# Patient Record
Sex: FEMALE | Race: WHITE | NOT HISPANIC OR LATINO | Employment: OTHER | URBAN - METROPOLITAN AREA
[De-identification: names, ages, dates, MRNs, and addresses within clinical notes are randomized per-mention and may not be internally consistent; named-entity substitution may affect disease eponyms.]

---

## 2023-03-06 DIAGNOSIS — F32.A DEPRESSION, UNSPECIFIED DEPRESSION TYPE: ICD-10-CM

## 2023-03-06 DIAGNOSIS — F06.4 ORGANIC ANXIETY DISORDER: ICD-10-CM

## 2023-03-06 RX ORDER — TRAZODONE HYDROCHLORIDE 100 MG/1
2 TABLET ORAL NIGHTLY
COMMUNITY
Start: 2019-08-05 | End: 2023-03-06 | Stop reason: SDUPTHER
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
## 2023-03-07 PROBLEM — G47.00 INSOMNIA: Status: ACTIVE | Noted: 2023-03-07

## 2023-03-07 PROBLEM — J01.90 ACUTE SINUSITIS: Status: ACTIVE | Noted: 2023-03-07

## 2023-03-07 PROBLEM — M85.80 OSTEOPENIA: Status: ACTIVE | Noted: 2023-03-07

## 2023-03-07 PROBLEM — F17.200 NICOTINE DEPENDENCE: Status: ACTIVE | Noted: 2023-03-07

## 2023-03-07 PROBLEM — S01.81XA LACERATION OF CHIN: Status: ACTIVE | Noted: 2023-03-07

## 2023-03-07 PROBLEM — M79.645 PAIN OF LEFT THUMB: Status: ACTIVE | Noted: 2023-03-07

## 2023-03-07 PROBLEM — M79.601 RIGHT ARM PAIN: Status: ACTIVE | Noted: 2023-03-07

## 2023-03-07 PROBLEM — M77.8 TENDINITIS OF FLEXOR TENDON OF LEFT HAND: Status: ACTIVE | Noted: 2023-03-07

## 2023-03-07 PROBLEM — N95.2 ATROPHY OF VAGINA: Status: ACTIVE | Noted: 2023-03-07

## 2023-03-07 PROBLEM — E78.00 HYPERCHOLESTEROLEMIA: Status: ACTIVE | Noted: 2023-03-07

## 2023-03-07 PROBLEM — J44.9 COPD, MILD (MULTI): Status: ACTIVE | Noted: 2023-03-07

## 2023-03-07 PROBLEM — J01.80 OTHER ACUTE SINUSITIS: Status: ACTIVE | Noted: 2023-03-07

## 2023-03-07 PROBLEM — F32.A DEPRESSION: Status: ACTIVE | Noted: 2023-03-07

## 2023-03-07 PROBLEM — H69.90 EUSTACHIAN TUBE DYSFUNCTION: Status: ACTIVE | Noted: 2023-03-07

## 2023-03-07 PROBLEM — H66.90 OTITIS MEDIA: Status: ACTIVE | Noted: 2023-03-07

## 2023-03-07 PROBLEM — R19.7 DIARRHEA: Status: ACTIVE | Noted: 2023-03-07

## 2023-03-07 PROBLEM — F06.4 ANXIETY DISORDER, TRANSIENT ORGANIC: Status: ACTIVE | Noted: 2023-03-07

## 2023-03-07 PROBLEM — R92.30 DENSE BREAST TISSUE: Status: ACTIVE | Noted: 2023-03-07

## 2023-03-07 PROBLEM — S01.511A LACERATION OF LIP, INITIAL ENCOUNTER: Status: ACTIVE | Noted: 2023-03-07

## 2023-03-07 PROBLEM — J06.9 VIRAL UPPER RESPIRATORY INFECTION: Status: ACTIVE | Noted: 2023-03-07

## 2023-03-07 RX ORDER — ESCITALOPRAM OXALATE 20 MG/1
2 TABLET ORAL DAILY
COMMUNITY
Start: 2018-01-12 | End: 2023-04-06 | Stop reason: SDUPTHER

## 2023-03-07 RX ORDER — TRIAMTERENE/HYDROCHLOROTHIAZID 37.5-25 MG
1 TABLET ORAL DAILY
COMMUNITY
Start: 2018-07-07 | End: 2023-04-06 | Stop reason: SDUPTHER

## 2023-03-07 RX ORDER — ZOLPIDEM TARTRATE 10 MG/1
1 TABLET ORAL NIGHTLY PRN
COMMUNITY
Start: 2015-05-02 | End: 2024-02-26 | Stop reason: SDUPTHER

## 2023-03-07 RX ORDER — TRAZODONE HYDROCHLORIDE 100 MG/1
200 TABLET ORAL NIGHTLY
Qty: 180 TABLET | Refills: 0 | Status: SHIPPED | OUTPATIENT
Start: 2023-03-07 | End: 2023-05-12 | Stop reason: SDUPTHER

## 2023-03-07 RX ORDER — ATORVASTATIN CALCIUM 10 MG/1
1 TABLET, FILM COATED ORAL DAILY
COMMUNITY
Start: 2014-08-21 | End: 2023-04-06 | Stop reason: SDUPTHER

## 2023-03-07 RX ORDER — AMLODIPINE BESYLATE 5 MG/1
1 TABLET ORAL DAILY
COMMUNITY
Start: 2018-07-07 | End: 2023-04-06 | Stop reason: SDUPTHER

## 2023-03-07 RX ORDER — ESTRADIOL 0.1 MG/G
CREAM VAGINAL
COMMUNITY
Start: 2016-05-07 | End: 2024-02-26 | Stop reason: SDUPTHER

## 2023-03-07 RX ORDER — NICOTINE 7MG/24HR
1 PATCH, TRANSDERMAL 24 HOURS TRANSDERMAL
COMMUNITY
Start: 2022-03-08

## 2023-03-10 ENCOUNTER — OFFICE VISIT (OUTPATIENT)
Dept: PRIMARY CARE | Facility: CLINIC | Age: 63
End: 2023-03-10
Payer: COMMERCIAL

## 2023-03-10 ENCOUNTER — LAB (OUTPATIENT)
Dept: LAB | Facility: LAB | Age: 63
End: 2023-03-10
Payer: COMMERCIAL

## 2023-03-10 VITALS — BODY MASS INDEX: 19.63 KG/M2 | WEIGHT: 104 LBS | HEIGHT: 61 IN

## 2023-03-10 DIAGNOSIS — Z87.891 PERSONAL HISTORY OF NICOTINE DEPENDENCE: ICD-10-CM

## 2023-03-10 DIAGNOSIS — E83.52 HYPERCALCEMIA: ICD-10-CM

## 2023-03-10 DIAGNOSIS — Z00.00 ROUTINE GENERAL MEDICAL EXAMINATION AT A HEALTH CARE FACILITY: ICD-10-CM

## 2023-03-10 DIAGNOSIS — Z00.00 ROUTINE GENERAL MEDICAL EXAMINATION AT A HEALTH CARE FACILITY: Primary | ICD-10-CM

## 2023-03-10 LAB
ALANINE AMINOTRANSFERASE (SGPT) (U/L) IN SER/PLAS: 18 U/L (ref 7–45)
ALBUMIN (G/DL) IN SER/PLAS: 4.4 G/DL (ref 3.4–5)
ALKALINE PHOSPHATASE (U/L) IN SER/PLAS: 55 U/L (ref 33–136)
ANION GAP IN SER/PLAS: 11 MMOL/L (ref 10–20)
ASPARTATE AMINOTRANSFERASE (SGOT) (U/L) IN SER/PLAS: 18 U/L (ref 9–39)
BASOPHILS (10*3/UL) IN BLOOD BY AUTOMATED COUNT: 0.1 X10E9/L (ref 0–0.1)
BASOPHILS/100 LEUKOCYTES IN BLOOD BY AUTOMATED COUNT: 0.7 % (ref 0–2)
BILIRUBIN TOTAL (MG/DL) IN SER/PLAS: 0.4 MG/DL (ref 0–1.2)
CALCIDIOL (25 OH VITAMIN D3) (NG/ML) IN SER/PLAS: 33 NG/ML
CALCIUM (MG/DL) IN SER/PLAS: 10.6 MG/DL (ref 8.6–10.6)
CARBON DIOXIDE, TOTAL (MMOL/L) IN SER/PLAS: 31 MMOL/L (ref 21–32)
CHLORIDE (MMOL/L) IN SER/PLAS: 96 MMOL/L (ref 98–107)
CHOLESTEROL (MG/DL) IN SER/PLAS: 199 MG/DL (ref 0–199)
CHOLESTEROL IN HDL (MG/DL) IN SER/PLAS: 68.9 MG/DL
CHOLESTEROL/HDL RATIO: 2.9
CREATININE (MG/DL) IN SER/PLAS: 0.64 MG/DL (ref 0.5–1.05)
EOSINOPHILS (10*3/UL) IN BLOOD BY AUTOMATED COUNT: 0.25 X10E9/L (ref 0–0.7)
EOSINOPHILS/100 LEUKOCYTES IN BLOOD BY AUTOMATED COUNT: 1.7 % (ref 0–6)
ERYTHROCYTE DISTRIBUTION WIDTH (RATIO) BY AUTOMATED COUNT: 11.7 % (ref 11.5–14.5)
ERYTHROCYTE MEAN CORPUSCULAR HEMOGLOBIN CONCENTRATION (G/DL) BY AUTOMATED: 34.8 G/DL (ref 32–36)
ERYTHROCYTE MEAN CORPUSCULAR VOLUME (FL) BY AUTOMATED COUNT: 96 FL (ref 80–100)
ERYTHROCYTES (10*6/UL) IN BLOOD BY AUTOMATED COUNT: 4.39 X10E12/L (ref 4–5.2)
ESTIMATED AVERAGE GLUCOSE FOR HBA1C: 108 MG/DL
GFR FEMALE: >90 ML/MIN/1.73M2
GLUCOSE (MG/DL) IN SER/PLAS: 95 MG/DL (ref 74–99)
HEMATOCRIT (%) IN BLOOD BY AUTOMATED COUNT: 42.3 % (ref 36–46)
HEMOGLOBIN (G/DL) IN BLOOD: 14.7 G/DL (ref 12–16)
HEMOGLOBIN A1C/HEMOGLOBIN TOTAL IN BLOOD: 5.4 %
IMMATURE GRANULOCYTES/100 LEUKOCYTES IN BLOOD BY AUTOMATED COUNT: 1.2 % (ref 0–0.9)
LDL: ABNORMAL MG/DL (ref 0–99)
LEUKOCYTES (10*3/UL) IN BLOOD BY AUTOMATED COUNT: 14.7 X10E9/L (ref 4.4–11.3)
LYMPHOCYTES (10*3/UL) IN BLOOD BY AUTOMATED COUNT: 3.17 X10E9/L (ref 1.2–4.8)
LYMPHOCYTES/100 LEUKOCYTES IN BLOOD BY AUTOMATED COUNT: 21.6 % (ref 13–44)
MONOCYTES (10*3/UL) IN BLOOD BY AUTOMATED COUNT: 0.94 X10E9/L (ref 0.1–1)
MONOCYTES/100 LEUKOCYTES IN BLOOD BY AUTOMATED COUNT: 6.4 % (ref 2–10)
NEUTROPHILS (10*3/UL) IN BLOOD BY AUTOMATED COUNT: 10.05 X10E9/L (ref 1.2–7.7)
NEUTROPHILS/100 LEUKOCYTES IN BLOOD BY AUTOMATED COUNT: 68.4 % (ref 40–80)
NRBC (PER 100 WBCS) BY AUTOMATED COUNT: 0 /100 WBC (ref 0–0)
PARATHYRIN INTACT (PG/ML) IN SER/PLAS: 32.8 PG/ML (ref 18.5–88)
PLATELETS (10*3/UL) IN BLOOD AUTOMATED COUNT: 323 X10E9/L (ref 150–450)
POTASSIUM (MMOL/L) IN SER/PLAS: 3.8 MMOL/L (ref 3.5–5.3)
PROTEIN TOTAL: 6.8 G/DL (ref 6.4–8.2)
SODIUM (MMOL/L) IN SER/PLAS: 134 MMOL/L (ref 136–145)
THYROTROPIN (MIU/L) IN SER/PLAS BY DETECTION LIMIT <= 0.05 MIU/L: 2.69 MIU/L (ref 0.44–3.98)
TRIGLYCERIDE (MG/DL) IN SER/PLAS: 525 MG/DL (ref 0–149)
UREA NITROGEN (MG/DL) IN SER/PLAS: 10 MG/DL (ref 6–23)
VLDL: ABNORMAL MG/DL (ref 0–40)

## 2023-03-10 PROCEDURE — 80061 LIPID PANEL: CPT

## 2023-03-10 PROCEDURE — 83970 ASSAY OF PARATHORMONE: CPT

## 2023-03-10 PROCEDURE — 84443 ASSAY THYROID STIM HORMONE: CPT

## 2023-03-10 PROCEDURE — 99396 PREV VISIT EST AGE 40-64: CPT | Performed by: INTERNAL MEDICINE

## 2023-03-10 PROCEDURE — 80053 COMPREHEN METABOLIC PANEL: CPT

## 2023-03-10 PROCEDURE — 36415 COLL VENOUS BLD VENIPUNCTURE: CPT

## 2023-03-10 PROCEDURE — 82306 VITAMIN D 25 HYDROXY: CPT

## 2023-03-10 PROCEDURE — 85025 COMPLETE CBC W/AUTO DIFF WBC: CPT

## 2023-03-10 PROCEDURE — 83036 HEMOGLOBIN GLYCOSYLATED A1C: CPT

## 2023-03-10 NOTE — PATIENT INSTRUCTIONS
Rosette,     Go to [address] to have lab work done today.   Call [phone] to schedule a CAT scan of the chest for lung cancer screening.   Call [phone] to schedule a mammogram on or after April 26, 2023.   See me in a year!

## 2023-03-10 NOTE — PROGRESS NOTES
"      Gifty Shaw is a 61 yo F presenting for wellness visit.     1. Tobacco abuse, 1/2 ppd x40 years   [ ]CT lung screenng     2. HTN   -amlo   -Maxzide     3. DLD   -atorva 10     4. MDD/ANI/Insomnia   -Lexapro 40   -trazo 200   -Ambien 10 mg (gynecologist prescribes)     5. Osteopenia   -dexa due     6. Atrophic vaginitis   -estrace via gyne     #HM   [ ]due full screen labs incl pth   [ ]CT Lung screening   -pap 12.22 with gyne   -mammo due 4.26.23   -cscope 7.22 --> 5 years   -shingrix x2   -tdap 2018 12/21/2020    11:45 AM 3/1/2021    10:45 AM 12/21/2021    10:28 AM 3/8/2022    10:22 AM 11/1/2022    10:10 AM 12/27/2022    10:48 AM 3/10/2023     2:28 PM   Vitals   Systolic 120 118 110 108  140    Diastolic 80 74 72 72  95    Heart Rate  72  70      Resp  12  12      Height (in) 1.549 m (5' 1\")  1.549 m (5' 1\") 1.549 m (5' 1\") 1.549 m (5' 1\") 1.549 m (5' 1\") 1.549 m (5' 1\")   Weight (lb) 101 99.4 103 106.4 105 104 104   BMI 19.08 kg/m2 18.78 kg/m2 19.46 kg/m2 20.1 kg/m2 19.84 kg/m2 19.65 kg/m2 19.65 kg/m2   BSA (m2) 1.4 m2 1.39 m2 1.42 m2 1.44 m2 1.43 m2 1.43 m2 1.43 m2   Visit Report       Report       Gen Aox3 NAD well appearing   HEENT mmm pharynx clear no cervical LAD   Eyes sclerae clear   CV rrr nl s1/2 no m/r/g  Pulm CTAB no adventitious sounds   Ext warm dry no edema 2+ DP pulse       "

## 2023-04-06 DIAGNOSIS — F32.A DEPRESSION, UNSPECIFIED DEPRESSION TYPE: ICD-10-CM

## 2023-04-06 DIAGNOSIS — E78.00 HYPERCHOLESTEROLEMIA: ICD-10-CM

## 2023-04-06 DIAGNOSIS — I10 HYPERTENSION, UNSPECIFIED TYPE: ICD-10-CM

## 2023-04-06 RX ORDER — ESCITALOPRAM OXALATE 20 MG/1
40 TABLET ORAL DAILY
Qty: 180 TABLET | Refills: 3 | Status: SHIPPED | OUTPATIENT
Start: 2023-04-06

## 2023-04-06 RX ORDER — ATORVASTATIN CALCIUM 10 MG/1
10 TABLET, FILM COATED ORAL DAILY
Qty: 90 TABLET | Refills: 3 | Status: SHIPPED | OUTPATIENT
Start: 2023-04-06

## 2023-04-06 RX ORDER — AMLODIPINE BESYLATE 5 MG/1
5 TABLET ORAL DAILY
Qty: 90 TABLET | Refills: 3 | Status: SHIPPED | OUTPATIENT
Start: 2023-04-06

## 2023-04-06 RX ORDER — TRIAMTERENE/HYDROCHLOROTHIAZID 37.5-25 MG
1 TABLET ORAL DAILY
Qty: 90 TABLET | Refills: 3 | Status: SHIPPED | OUTPATIENT
Start: 2023-04-06

## 2023-05-12 DIAGNOSIS — F06.4 ORGANIC ANXIETY DISORDER: ICD-10-CM

## 2023-05-12 DIAGNOSIS — F32.A DEPRESSION, UNSPECIFIED DEPRESSION TYPE: ICD-10-CM

## 2023-05-12 RX ORDER — TRAZODONE HYDROCHLORIDE 100 MG/1
200 TABLET ORAL NIGHTLY
Qty: 180 TABLET | Refills: 2 | Status: SHIPPED | OUTPATIENT
Start: 2023-05-12 | End: 2024-04-08

## 2023-06-06 LAB
6-ACETYLMORPHINE: <25 NG/ML
7-AMINOCLONAZEPAM: <25 NG/ML
ALPHA-HYDROXYALPRAZOLAM: <25 NG/ML
ALPHA-HYDROXYMIDAZOLAM: <25 NG/ML
ALPRAZOLAM: <25 NG/ML
AMPHETAMINE (PRESENCE) IN URINE BY SCREEN METHOD: ABNORMAL
BARBITURATES PRESENCE IN URINE BY SCREEN METHOD: ABNORMAL
CANNABINOIDS IN URINE BY SCREEN METHOD: ABNORMAL
CHLORDIAZEPOXIDE: <25 NG/ML
CLONAZEPAM: <25 NG/ML
COCAINE (PRESENCE) IN URINE BY SCREEN METHOD: ABNORMAL
CODEINE: <50 NG/ML
CREATINE, URINE FOR DRUG: 50.4 MG/DL
DIAZEPAM: <25 NG/ML
DRUG SCREEN COMMENT URINE: ABNORMAL
EDDP: <25 NG/ML
FENTANYL CONFIRMATION, URINE: <2.5 NG/ML
HYDROCODONE: <25 NG/ML
HYDROMORPHONE: <25 NG/ML
LORAZEPAM: <25 NG/ML
METHADONE CONFIRMATION,URINE: <25 NG/ML
MIDAZOLAM: <25 NG/ML
MORPHINE URINE: <50 NG/ML
NORDIAZEPAM: <25 NG/ML
NORFENTANYL: <2.5 NG/ML
NORHYDROCODONE: <25 NG/ML
NOROXYCODONE: <25 NG/ML
O-DESMETHYLTRAMADOL: <50 NG/ML
OXAZEPAM: <25 NG/ML
OXYCODONE: <25 NG/ML
OXYMORPHONE: <25 NG/ML
PHENCYCLIDINE (PRESENCE) IN URINE BY SCREEN METHOD: ABNORMAL
TEMAZEPAM: <25 NG/ML
TRAMADOL: <50 NG/ML
ZOLPIDEM METABOLITE (ZCA): 297 NG/ML
ZOLPIDEM: <25 NG/ML

## 2023-10-03 ENCOUNTER — OFFICE VISIT (OUTPATIENT)
Dept: DERMATOLOGY | Facility: CLINIC | Age: 63
End: 2023-10-03
Payer: COMMERCIAL

## 2023-10-03 DIAGNOSIS — Z86.018 HISTORY OF DYSPLASTIC NEVUS: ICD-10-CM

## 2023-10-03 DIAGNOSIS — L57.0 ACTINIC KERATOSIS: ICD-10-CM

## 2023-10-03 DIAGNOSIS — L21.9 SEBORRHEIC DERMATITIS: ICD-10-CM

## 2023-10-03 DIAGNOSIS — D48.5 NEOPLASM OF UNCERTAIN BEHAVIOR OF SKIN: Primary | ICD-10-CM

## 2023-10-03 DIAGNOSIS — L82.1 SEBORRHEIC KERATOSIS: ICD-10-CM

## 2023-10-03 DIAGNOSIS — D22.5 MELANOCYTIC NEVUS OF TRUNK: ICD-10-CM

## 2023-10-03 DIAGNOSIS — L57.8 DIFFUSE PHOTODAMAGE OF SKIN: ICD-10-CM

## 2023-10-03 DIAGNOSIS — L82.0 INFLAMED SEBORRHEIC KERATOSIS: ICD-10-CM

## 2023-10-03 PROCEDURE — 17003 DESTRUCT PREMALG LES 2-14: CPT | Performed by: DERMATOLOGY

## 2023-10-03 PROCEDURE — 11104 PUNCH BX SKIN SINGLE LESION: CPT | Performed by: DERMATOLOGY

## 2023-10-03 PROCEDURE — 17000 DESTRUCT PREMALG LESION: CPT | Performed by: DERMATOLOGY

## 2023-10-03 PROCEDURE — 11302 SHAVE SKIN LESION 1.1-2.0 CM: CPT | Performed by: DERMATOLOGY

## 2023-10-03 PROCEDURE — 17110 DESTRUCTION B9 LES UP TO 14: CPT | Performed by: DERMATOLOGY

## 2023-10-03 PROCEDURE — 99214 OFFICE O/P EST MOD 30 MIN: CPT | Performed by: DERMATOLOGY

## 2023-10-03 PROCEDURE — 11301 SHAVE SKIN LESION 0.6-1.0 CM: CPT | Performed by: DERMATOLOGY

## 2023-10-03 RX ORDER — TRETINOIN 0.25 MG/G
CREAM TOPICAL NIGHTLY
Qty: 45 G | Refills: 11 | Status: SHIPPED | OUTPATIENT
Start: 2023-10-03 | End: 2024-10-02

## 2023-10-03 ASSESSMENT — DERMATOLOGY QUALITY OF LIFE (QOL) ASSESSMENT
RATE HOW BOTHERED YOU ARE BY EFFECTS OF YOUR SKIN PROBLEMS ON YOUR ACTIVITIES (EG, GOING OUT, ACCOMPLISHING WHAT YOU WANT, WORK ACTIVITIES OR YOUR RELATIONSHIPS WITH OTHERS): 0 - NEVER BOTHERED
ARE THERE EXCLUSIONS OR EXCEPTIONS FOR THE QUALITY OF LIFE ASSESSMENT: NO
DATE THE QUALITY-OF-LIFE ASSESSMENT WAS COMPLETED: 66750
WHAT SINGLE SKIN CONDITION LISTED BELOW IS THE PATIENT ANSWERING THE QUALITY-OF-LIFE ASSESSMENT QUESTIONS ABOUT: NONE OF THE ABOVE
RATE HOW BOTHERED YOU ARE BY SYMPTOMS OF YOUR SKIN PROBLEM (EG, ITCHING, STINGING BURNING, HURTING OR SKIN IRRITATION): 2
RATE HOW EMOTIONALLY BOTHERED YOU ARE BY YOUR SKIN PROBLEM (FOR EXAMPLE, WORRY, EMBARRASSMENT, FRUSTRATION): 1

## 2023-10-03 ASSESSMENT — DERMATOLOGY PATIENT ASSESSMENT
DO YOU USE SUNSCREEN: DAILY
DO YOU HAVE ANY NEW OR CHANGING LESIONS: YES
DO YOU HAVE IRREGULAR MENSTRUAL CYCLES: NO
ARE YOU TRYING TO GET PREGNANT: NO
ARE YOU AN ORGAN TRANSPLANT RECIPIENT: NO
HAVE YOU HAD OR DO YOU HAVE VASCULAR DISEASE: NO
DO YOU USE A TANNING BED: NO
HAVE YOU HAD OR DO YOU HAVE A STAPH INFECTION: NO
ARE YOU ON BIRTH CONTROL: NO

## 2023-10-03 ASSESSMENT — PATIENT GLOBAL ASSESSMENT (PGA): PATIENT GLOBAL ASSESSMENT: PATIENT GLOBAL ASSESSMENT:  2 - MILD

## 2023-10-03 ASSESSMENT — ITCH NUMERIC RATING SCALE: HOW SEVERE IS YOUR ITCHING?: 2

## 2023-10-03 NOTE — PROGRESS NOTES
Subjective     Gifty Shaw is a 63 y.o. female who presents for the following: Skin Exam. She notes a painful bump on the back left side of her neck and a raised, scaly, itchy bump on her left upper eyelid.    Review of Systems:  No other skin or systemic complaints other than what is documented elsewhere in the note.    The following portions of the chart were reviewed this encounter and updated as appropriate:         Skin Cancer History  - history of Aks  - dysplastic nevi  - no h/o skin cancer      Specialty Problems          Dermatology Problems    Dog bite        Objective   Well appearing patient in no apparent distress; mood and affect are within normal limits.    A full examination was performed including scalp, head, eyes, ears, nose, lips, neck, chest, axillae, abdomen, back, buttocks, bilateral upper extremities, bilateral lower extremities, hands, feet, fingers, toes, fingernails, and toenails. All findings within normal limits unless otherwise noted below.    Assessment/Plan   1. Neoplasm of uncertain behavior of skin (3)  Left Posterior Neck  3 mm erythematous, tender papule          Lesion biopsy  Type of biopsy: punch    Informed consent: discussed and consent obtained    Timeout: patient name, date of birth, surgical site, and procedure verified    Procedure prep:  Patient was prepped and draped  Anesthesia: the lesion was anesthetized in a standard fashion    Anesthetic:  1% lidocaine w/ epinephrine 1-100,000 local infiltration  Punch size:  3 mm  Suture size:  5-0  Suture type: Prolene (polypropylene)    Suture removal (days):  7  Hemostasis achieved with: suture    Outcome: patient tolerated procedure well    Post-procedure details: sterile dressing applied and wound care instructions given    Dressing type: petrolatum and bandage      Staff Communication: Dermatology Local Anesthesia: 1 % Lidocaine / Epinephrine - Amount:0.5 mL    Specimen 1 - Dermatopathology- DERM LAB  Differential  Diagnosis: folliculitis vs. SCC  Check Margins Yes/No?:    Comments:    Dermpath Lab: Routine Histopathology (formalin-fixed tissue)    Left lateral clavicular chest  Scattered, uniform and benign-appearing, regular brown melanocytic papules and macules.          Shave removal    Lesion length (cm):  0.7  Margin per side (cm):  2  Lesion diameter (cm):  1.1  Informed consent: discussed and consent obtained    Timeout: patient name, date of birth, surgical site, and procedure verified    Procedure prep:  Patient was prepped and draped  Anesthesia: the lesion was anesthetized in a standard fashion    Anesthetic:  1% lidocaine w/ epinephrine 1-100,000 local infiltration  Instrument used: flexible razor blade    Hemostasis achieved with: aluminum chloride    Outcome: patient tolerated procedure well    Post-procedure details: sterile dressing applied and wound care instructions given    Dressing type: bandage and petrolatum      Staff Communication: Dermatology Local Anesthesia: 1 % Lidocaine / Epinephrine - Amount:0.5 mL    Specimen 2 - Dermatopathology- DERM LAB  Differential Diagnosis: DN  Check Margins Yes/No?:    Comments:    Dermpath Lab: Routine Histopathology (formalin-fixed tissue)    Left medial mid leg  Scattered, uniform and benign-appearing, regular brown melanocytic papules and macules.          Shave removal    Lesion length (cm):  0.5  Margin per side (cm):  0.2  Lesion diameter (cm):  0.9  Informed consent: discussed and consent obtained    Timeout: patient name, date of birth, surgical site, and procedure verified    Procedure prep:  Patient was prepped and draped  Anesthesia: the lesion was anesthetized in a standard fashion    Anesthetic:  1% lidocaine w/ epinephrine 1-100,000 local infiltration  Instrument used: flexible razor blade    Hemostasis achieved with: aluminum chloride    Outcome: patient tolerated procedure well    Post-procedure details: sterile dressing applied and wound care  instructions given    Dressing type: bandage and petrolatum      Staff Communication: Dermatology Local Anesthesia: 1 % Lidocaine / Epinephrine - Amount:0.5 mL    Specimen 3 - Dermatopathology- DERM LAB  Differential Diagnosis: DN  Check Margins Yes/No?:    Comments:    Dermpath Lab: Routine Histopathology (formalin-fixed tissue)    2. Actinic keratosis (6)  Head - Anterior (Face) (6)  Scattered on the patient's bilateral cheeks, there are 6 erythematous, gritty, scaly macules     Actinic Keratoses - scattered on bilateral cheeks.  The pre-cancerous nature of these lesions and treatment options were discussed with the patient today.  At this time, I recommend treatment with liquid nitrogen cryotherapy.  The patient expressed understanding, is in agreement with this plan, and wishes to proceed with cryotherapy today.    Destr of lesion - Head - Anterior (Face)  Complexity: simple    Destruction method: cryotherapy    Informed consent: discussed and consent obtained    Lesion destroyed using liquid nitrogen: Yes    Cryotherapy cycles:  2  Outcome: patient tolerated procedure well with no complications    Post-procedure details: wound care instructions given      3. Inflamed seborrheic keratosis  Left Eye  On the patient's left upper eyelid, there is a 3 mm erythematous and light brown-colored, hyperkeratotic, stuck-on appearing papule with a surrounding rim of erythema    Inflamed Seborrheic Keratosis - left upper eyelid.  The benign nature of this lesion was discussed with the patient today and reassurance provided.  Given the history the patient provides of frequent irritation and associated symptoms as well as its inflamed appearance on exam today, I offered to treat this lesion with liquid nitrogen cryotherapy.  The patient expressed understanding, is in agreement with this plan, and wishes to proceed with cryotherapy today.    Destr of lesion - Left Eye  Complexity: simple    Destruction method: cryotherapy     Informed consent: discussed and consent obtained    Lesion destroyed using liquid nitrogen: Yes    Cryotherapy cycles:  2  Outcome: patient tolerated procedure well with no complications    Post-procedure details: wound care instructions given      4. Melanocytic nevus of trunk  Right Abdomen (side) - Upper  Scattered on the patient's face, neck, trunk, and extremities, there are multiple small, round- to oval-shaped, brown-pigmented and pink-colored, symmetric, uniform-appearing macules and dome-shaped papules    Clinically benign- to slightly atypical-appearing nevi - scattered on face, neck, trunk, and extremities.  The clinically benign- to slightly atypical-appearing nature of the remainder of the patient's nevi was discussed with the patient today.  None of the patient's nevi, with the exception of the 2 noted above, meet threshold for biopsy today.  I emphasized the importance of performing monthly self-skin exams using the ABCDs of monitoring moles, which were reviewed with the patient today and an informational hand-out provided.  I also emphasized the importance of sun avoidance and sun protection with daily sunscreen use.    5. Seborrheic keratosis  Left Abdomen (side) - Upper  Scattered on the patient's face, neck, trunk, and extremities, there are multiple tan- to light brown-colored, hyperkeratotic, stuck-on appearing papules of varying size and shape    Seborrheic Keratoses - scattered on face, neck, trunk, and extremities.  The benign nature of these lesions was discussed with the patient today and reassurance provided.  No treatment is medically indicated for the non-inflamed SKs at this time.    6. Seborrheic dermatitis  Head - Anterior (Face)  On the patient's face, mainly the glabella and bilateral eyebrows and perinasal creases, there are pink, scaly patches with whitish-yellowish, greasy scale    Seborrheic Dermatitis - face.  The potentially chronic and intermittently flaring nature of this  condition and treatment options were discussed extensively with the patient today.  At this time, I recommend topical anti-fungal therapy with Ketoconazole 2% cream, which the patient was instructed to apply twice daily to the affected areas of the face.  The risks, benefits, and side effects of this medication were discussed.  The patient expressed understanding and is in agreement with this plan.    7. History of dysplastic nevus  Right Abdomen (side) - Upper  On the patient's trunk and extremities, there are well-healed scars with no evidence of recurrent growth or pigmentation on exam today.    History of dysplastic nevi and photodamage.  There is no evidence of recurrence on exam today.  I emphasized the importance of continuing to perform monthly self-skin exams using the ABCDs of monitoring moles, which were reviewed with the patient, as well as the importance of sun avoidance and sun protection with daily sunscreen use.  I will have the patient return to our office in 1 year, pending the above biopsy results, for routine follow-up and skin exam, and the patient was instructed to call our office should the patient notice any new, changing, symptomatic, or otherwise concerning skin lesions before then.  The patient expressed understanding and is in agreement with this plan.    Related Procedures  Follow Up In Dermatology - Established Patient    8. Diffuse photodamage of skin  Left Breast  Diffuse photodamage with actinic changes with telangiectasia and mottled pigmentation in sun-exposed areas.    Photodamage.  The signs and symptoms of skin cancer were reviewed and the patient was advised to practice sun protection and sun avoidance, use daily sunscreen, and perform regular self skin exams.  In addition, the patient wished to discuss possible medical treatment for photoaging.  Thus, at this time, I recommend the patient begin topical retinoid therapy with Tretinoin 0.025% cream at night.  The risks,  benefits, and side effects of this medication, including the redness, dryness, and irritation expected with its use, were discussed; the patient was instructed to begin use of Tretinoin 1-2 nights per week and increase to every other night, then every night as tolerated without excessive dryness and irritation.  The patient was also informed this medication will likely be considered cosmetic and thus may not be covered by their insurance company and require out-of-pocket payment.  The patient expressed understanding and is in agreement with this plan.    tretinoin (Retin-A) 0.025 % cream - Left Breast  Apply topically once daily at bedtime.

## 2023-10-06 LAB
LABORATORY COMMENT REPORT: NORMAL
PATH REPORT.FINAL DX SPEC: NORMAL
PATH REPORT.GROSS SPEC: NORMAL
PATH REPORT.MICROSCOPIC SPEC OTHER STN: NORMAL
PATH REPORT.RELEVANT HX SPEC: NORMAL
PATH REPORT.TOTAL CANCER: NORMAL

## 2023-10-09 ENCOUNTER — OFFICE VISIT (OUTPATIENT)
Dept: DERMATOLOGY | Facility: CLINIC | Age: 63
End: 2023-10-09
Payer: COMMERCIAL

## 2023-10-09 DIAGNOSIS — L81.4 LENTIGO: ICD-10-CM

## 2023-10-09 DIAGNOSIS — L73.9 FOLLICULITIS: Primary | ICD-10-CM

## 2023-10-09 DIAGNOSIS — L70.0 ACNE VULGARIS: ICD-10-CM

## 2023-10-09 DIAGNOSIS — L57.8 DIFFUSE PHOTODAMAGE OF SKIN: ICD-10-CM

## 2023-10-09 PROCEDURE — 99214 OFFICE O/P EST MOD 30 MIN: CPT | Performed by: DERMATOLOGY

## 2023-10-09 RX ORDER — CLINDAMYCIN PHOSPHATE 10 UG/ML
LOTION TOPICAL 2 TIMES DAILY
Qty: 60 ML | Refills: 11 | Status: SHIPPED | OUTPATIENT
Start: 2023-10-09 | End: 2024-10-08

## 2023-10-09 ASSESSMENT — DERMATOLOGY PATIENT ASSESSMENT
HAVE YOU HAD OR DO YOU HAVE VASCULAR DISEASE: NO
DO YOU USE SUNSCREEN: DAILY
HAVE YOU HAD OR DO YOU HAVE A STAPH INFECTION: NO
ARE YOU ON BIRTH CONTROL: NO
ARE YOU AN ORGAN TRANSPLANT RECIPIENT: NO
DO YOU USE A TANNING BED: NO
DO YOU HAVE IRREGULAR MENSTRUAL CYCLES: NO
ARE YOU TRYING TO GET PREGNANT: NO
DO YOU HAVE ANY NEW OR CHANGING LESIONS: NO

## 2023-10-09 ASSESSMENT — PATIENT GLOBAL ASSESSMENT (PGA): PATIENT GLOBAL ASSESSMENT: PATIENT GLOBAL ASSESSMENT:  1 - CLEAR

## 2023-10-09 ASSESSMENT — ITCH NUMERIC RATING SCALE: HOW SEVERE IS YOUR ITCHING?: 0

## 2023-10-09 ASSESSMENT — DERMATOLOGY QUALITY OF LIFE (QOL) ASSESSMENT
WHAT SINGLE SKIN CONDITION LISTED BELOW IS THE PATIENT ANSWERING THE QUALITY-OF-LIFE ASSESSMENT QUESTIONS ABOUT: NONE OF THE ABOVE
RATE HOW BOTHERED YOU ARE BY EFFECTS OF YOUR SKIN PROBLEMS ON YOUR ACTIVITIES (EG, GOING OUT, ACCOMPLISHING WHAT YOU WANT, WORK ACTIVITIES OR YOUR RELATIONSHIPS WITH OTHERS): 0 - NEVER BOTHERED
RATE HOW BOTHERED YOU ARE BY SYMPTOMS OF YOUR SKIN PROBLEM (EG, ITCHING, STINGING BURNING, HURTING OR SKIN IRRITATION): 0 - NEVER BOTHERED
RATE HOW EMOTIONALLY BOTHERED YOU ARE BY YOUR SKIN PROBLEM (FOR EXAMPLE, WORRY, EMBARRASSMENT, FRUSTRATION): 0 - NEVER BOTHERED

## 2023-10-10 NOTE — PROGRESS NOTES
"Subjective     Gifty Shaw is a 63 y.o. female who presents for the following: OTHER (Discuss recent biopsy result and management options.).     Biopsy of a suspicious lesion on her left posterior neck performed at her last visit in our office on 10/3/23 revealed a \"ruptured hair follicle.\"  Of note, biopsy of 2 atypical appearing pigmented lesions also performed at that visit revealed a lentiginous junctional nevus and venous lake on her left lateral clavicular chest and a lentigo on her left medial mid leg.    Review of Systems:  No other skin or systemic complaints other than what is documented elsewhere in the note.    The following portions of the chart were reviewed this encounter and updated as appropriate:       Skin Cancer History  - history of AKs  - moderately dysplastic junctional nevus on mid right lower back on 10/26/15  - mildly dysplastic junctional nevus on right upper back on 10/9/17  - mildly dysplastic junctional nevus on mid upper back diagnosed on 7/12/19  - no h/o skin cancer  - the patient reports a long history of eczema, for which she has used Ultravate on intermittent lesions for many years, but has been using the same tube for over 8 years and denies any active areas of eczema currently    Specialty Problems          Dermatology Problems    Dog bite       Allergies:  Bupropion, Sulfa (sulfonamide antibiotics), and Sulfamethoxazole-trimethoprim    Current Medications / CAM's:    Current Outpatient Medications:     amLODIPine (Norvasc) 5 mg tablet, Take 1 tablet (5 mg) by mouth once daily., Disp: 90 tablet, Rfl: 3    atorvastatin (Lipitor) 10 mg tablet, Take 1 tablet (10 mg) by mouth once daily., Disp: 90 tablet, Rfl: 3    escitalopram (Lexapro) 20 mg tablet, Take 2 tablets (40 mg) by mouth once daily., Disp: 180 tablet, Rfl: 3    estradiol (Estrace) 0.01 % (0.1 mg/gram) vaginal cream, Insert into the vagina. Apply pea sized amount to vaginal opening every Monday, Wednesday, and " Friday evening, Disp: , Rfl:     nicotine (Nicoderm CQ) 7 mg/24 hr patch, Place 1 patch on the skin once daily., Disp: , Rfl:     traZODone (Desyrel) 100 mg tablet, Take 2 tablets (200 mg) by mouth once daily at bedtime., Disp: 180 tablet, Rfl: 2    tretinoin (Retin-A) 0.025 % cream, Apply topically once daily at bedtime., Disp: 45 g, Rfl: 11    triamterene-hydrochlorothiazid (Maxzide-25) 37.5-25 mg tablet, Take 1 tablet by mouth once daily., Disp: 90 tablet, Rfl: 3    zolpidem (Ambien) 10 mg tablet, Take 1 tablet (10 mg) by mouth as needed at bedtime., Disp: , Rfl:     clindamycin (Cleocin T) 1 % lotion, Apply topically 2 times a day., Disp: 60 mL, Rfl: 11     Objective   Well appearing patient in no apparent distress; mood and affect are within normal limits.    A skin examination was performed including the face and neck. All findings within normal limits unless otherwise noted below.    Assessment/Plan   1. Folliculitis  Neck - Posterior  On her left posterior neck, there is a pink, well-healed scar at her recent biopsy site.  Scattered on her neck, including her posterior neck, there are a few follicular-based erythematous, inflammatory papules and pustules    Folliculitis - posterior neck.  The bacterial nature of this condition and treatment options were discussed with the patient today.  At this time, I recommend topical antibiotic therapy with Clindamycin 1% lotion, which the patient was instructed to apply twice daily to the affected areas or up to 3-4 times per day as needed for active lesions.  The risks, benefits, and side effects of this medication were discussed.  The patient expressed understanding and is in agreement with this plan.  Of note, her suture was removed in the office today as well.    clindamycin (Cleocin T) 1 % lotion - Neck - Posterior  Apply topically 2 times a day.    2. Acne vulgaris    Related Medications  clindamycin (Cleocin T) 1 % lotion  Apply topically 2 times a day.    3.  Lentigo  Photodistributed  Multiple tan- to light brown-colored, round- to oval-shaped, symmetric and uniform-appearing macules and small patches consistent with lentigines scattered in sun-exposed areas.    Solar Lentigines and photodamage.  The clinically benign-appearing nature of these lesions and their relation to chronic sun exposure were discussed with the patient today and reassurance provided.  None of these lesions meet threshold for biopsy today, and thus no treatment is medically indicated for these lesions at this time.  The signs and symptoms of skin cancer were reviewed and the patient was advised to practice sun protection and sun avoidance, use daily sunscreen, and perform regular self skin exams.  The patient was instructed to monitor these lesions for any changes, such as in size, shape, or color, or associated symptoms and to call our office to schedule a return visit for re-evaluation if any such changes or symptoms are noticed in the future.  The patient expressed understanding and is in agreement with this plan.    4. Diffuse photodamage of skin  Photodistributed  Diffuse photodamage with actinic changes with telangiectasia and mottled pigmentation in sun-exposed areas.    Photodamage.  The signs and symptoms of skin cancer were reviewed and the patient was advised to practice sun protection and sun avoidance, use daily sunscreen, and perform regular self skin exams.  Sun protection was discussed, including avoiding the mid-day sun, wearing a sunscreen with SPF at least 50, and stressing the need for reapplication of sunscreen and applying more than they think they need.    Related Medications  tretinoin (Retin-A) 0.025 % cream  Apply topically once daily at bedtime.

## 2024-01-26 ENCOUNTER — APPOINTMENT (OUTPATIENT)
Dept: RADIOLOGY | Facility: CLINIC | Age: 64
End: 2024-01-26
Payer: COMMERCIAL

## 2024-01-26 ENCOUNTER — HOSPITAL ENCOUNTER (OUTPATIENT)
Dept: RADIOLOGY | Facility: CLINIC | Age: 64
Discharge: HOME | End: 2024-01-26
Payer: COMMERCIAL

## 2024-01-26 VITALS — BODY MASS INDEX: 19.65 KG/M2 | WEIGHT: 104.06 LBS | HEIGHT: 61 IN

## 2024-01-26 DIAGNOSIS — Z12.31 ENCOUNTER FOR SCREENING MAMMOGRAM FOR MALIGNANT NEOPLASM OF BREAST: ICD-10-CM

## 2024-01-26 PROCEDURE — 77067 SCR MAMMO BI INCL CAD: CPT | Performed by: RADIOLOGY

## 2024-01-26 PROCEDURE — 77067 SCR MAMMO BI INCL CAD: CPT

## 2024-01-26 PROCEDURE — 77063 BREAST TOMOSYNTHESIS BI: CPT | Performed by: RADIOLOGY

## 2024-02-26 ENCOUNTER — OFFICE VISIT (OUTPATIENT)
Dept: OBSTETRICS AND GYNECOLOGY | Facility: CLINIC | Age: 64
End: 2024-02-26
Payer: COMMERCIAL

## 2024-02-26 VITALS
BODY MASS INDEX: 19.07 KG/M2 | DIASTOLIC BLOOD PRESSURE: 82 MMHG | SYSTOLIC BLOOD PRESSURE: 131 MMHG | WEIGHT: 101 LBS | HEIGHT: 61 IN

## 2024-02-26 DIAGNOSIS — Z01.419 ENCOUNTER FOR GYNECOLOGICAL EXAMINATION WITHOUT ABNORMAL FINDING: ICD-10-CM

## 2024-02-26 DIAGNOSIS — G47.00 INSOMNIA, UNSPECIFIED TYPE: ICD-10-CM

## 2024-02-26 DIAGNOSIS — N95.2 ATROPHY OF VAGINA: Primary | ICD-10-CM

## 2024-02-26 DIAGNOSIS — M85.80 OSTEOPENIA, UNSPECIFIED LOCATION: ICD-10-CM

## 2024-02-26 PROCEDURE — 99396 PREV VISIT EST AGE 40-64: CPT | Performed by: OBSTETRICS & GYNECOLOGY

## 2024-02-26 RX ORDER — ZOLPIDEM TARTRATE 10 MG/1
10 TABLET ORAL NIGHTLY PRN
Qty: 30 TABLET | Refills: 0 | Status: SHIPPED | OUTPATIENT
Start: 2024-02-26 | End: 2024-05-23 | Stop reason: SDUPTHER

## 2024-02-26 RX ORDER — ESTRADIOL 0.1 MG/G
0.5 CREAM VAGINAL 3 TIMES WEEKLY
Qty: 42.5 G | Refills: 3 | Status: SHIPPED | OUTPATIENT
Start: 2024-02-26

## 2024-02-26 NOTE — PROGRESS NOTES
Subjective   Gifty Shaw is a 63 y.o. female here for a routine exam. Current complaints: She has chronic insomnia which is managed with zolpidem 10 mg at bedtime.  She does request a refill.  The last CSA was signed June 1, 2023.  She is managing dryness with estradiol cream.  There is no postmenopausal bleeding or pelvic pain.  No dysuria, no change in bowel habits or vaginal discharge.    She is current on her colonoscopy.    Her bone density March 2021 showed osteopenia of the spine, T-score -2.0.. Personal health questionnaire reviewed: yes.     Gynecologic History  Patient's last menstrual period was 01/01/2004 (approximate).  Contraception: post menopausal status  Last Pap: 12/27/22. Results were: normal  Last mammogram: 1/26/24. Results were: normal    Obstetric History  OB History   No obstetric history on file.       Objective   Constitutional: Alert and in no acute distress. Well developed, well nourished.   Head and Face: Head and face: Normal.    Eyes: Normal external exam - nonicteric sclera, extraocular movements intact (EOMI) and no ptosis.   Neck: No neck asymmetry. Supple. Thyroid not enlarged and there were no palpable thyroid nodules.    Pulmonary: No respiratory distress.   Chest: Breasts: Normal appearance, no nipple discharge and no skin changes. Palpation of breasts and axillae: No palpable mass and no axillary lymphadenopathy.   Abdomen: Soft nontender; no abdominal mass palpated. No organomegaly. No hernias.   Genitourinary: External genitalia: Normal. No inguinal lymphadenopathy. Bartholin's Urethral and Skenes Glands: Normal. Urethra: Normal.  Bladder: Normal on palpation. Vagina: Normal. Cervix: Normal.  Uterus: Normal.  Right Adnexa/parametria: Normal.  Left Adnexa/parametria: Normal.  Inspection of Perianal Area: Normal.   Musculoskeletal: No joint swelling seen, normal movements of all extremities.   Skin: Normal skin color and pigmentation, normal skin turgor, and no rash.    Neurologic: Non-focal. Grossly intact.   Psychiatric: Alert and oriented x 3. Affect normal to patient baseline. Mood: Appropriate.  Physical Exam     Assessment/Plan   Healthy female exam.  This is a 63-year-old female with a normal exam.  No Pap was sent, she is high risk HPV negative.    Her routine mammogram was ordered with tomosynthesis.   he is current on her colonoscopy.    We discussed obtaining a bone density test to monitor the osteopenia.  I do recommend calcium, vitamin D and weightbearing exercise.    The estradiol cream was refilled, she will use a pea-sized amount at the vaginal opening 3 times weekly.  Zolpidem 10 mg was refilled.  I will see her routinely in 1 year.  Mammogram ordered.

## 2024-04-06 DIAGNOSIS — F06.4 ORGANIC ANXIETY DISORDER: ICD-10-CM

## 2024-04-06 DIAGNOSIS — F32.A DEPRESSION, UNSPECIFIED DEPRESSION TYPE: ICD-10-CM

## 2024-04-08 RX ORDER — TRAZODONE HYDROCHLORIDE 100 MG/1
200 TABLET ORAL NIGHTLY
Qty: 180 TABLET | Refills: 3 | Status: SHIPPED | OUTPATIENT
Start: 2024-04-08

## 2024-05-23 DIAGNOSIS — G47.00 INSOMNIA, UNSPECIFIED TYPE: ICD-10-CM

## 2024-05-23 RX ORDER — ZOLPIDEM TARTRATE 10 MG/1
10 TABLET ORAL NIGHTLY PRN
Qty: 30 TABLET | Refills: 0 | Status: SHIPPED | OUTPATIENT
Start: 2024-05-23

## 2024-05-23 NOTE — TELEPHONE ENCOUNTER
Requested Prescriptions     Pending Prescriptions Disp Refills    zolpidem (Ambien) 10 mg tablet 30 tablet 0     Sig: Take 1 tablet (10 mg) by mouth as needed at bedtime for sleep.

## 2024-10-11 ENCOUNTER — APPOINTMENT (OUTPATIENT)
Dept: DERMATOLOGY | Facility: CLINIC | Age: 64
End: 2024-10-11
Payer: COMMERCIAL

## 2024-10-11 DIAGNOSIS — D48.5 NEOPLASM OF UNCERTAIN BEHAVIOR OF SKIN: Primary | ICD-10-CM

## 2024-10-11 DIAGNOSIS — L57.0 ACTINIC KERATOSIS: ICD-10-CM

## 2024-10-11 DIAGNOSIS — L82.1 SEBORRHEIC KERATOSIS: ICD-10-CM

## 2024-10-11 DIAGNOSIS — Z86.018 HISTORY OF DYSPLASTIC NEVUS: ICD-10-CM

## 2024-10-11 DIAGNOSIS — L81.4 LENTIGO: ICD-10-CM

## 2024-10-11 DIAGNOSIS — D22.5 MELANOCYTIC NEVUS OF TRUNK: ICD-10-CM

## 2024-10-11 DIAGNOSIS — L21.9 SEBORRHEIC DERMATITIS: ICD-10-CM

## 2024-10-11 DIAGNOSIS — L57.8 DIFFUSE PHOTODAMAGE OF SKIN: ICD-10-CM

## 2024-10-11 RX ORDER — KETOCONAZOLE 20 MG/G
CREAM TOPICAL
Qty: 60 G | Refills: 11 | Status: SHIPPED | OUTPATIENT
Start: 2024-10-11

## 2024-10-11 RX ORDER — TRETINOIN 0.25 MG/G
1 CREAM TOPICAL NIGHTLY
Qty: 45 G | Refills: 11 | Status: SHIPPED | OUTPATIENT
Start: 2024-10-11

## 2024-10-11 NOTE — PROGRESS NOTES
Subjective     Gifty Shaw is a 64 y.o. female who presents for the following: Skin Exam.  She notes dry, flaky skin on her face, especially in the creases of her nose.  She denies any new, changing, or concerning skin lesions since her last visit; no bleeding, itching, or burning lesions.      Review of Systems:  No other skin or systemic complaints other than what is documented elsewhere in the note.    The following portions of the chart were reviewed this encounter and updated as appropriate:       Skin Cancer History  No skin cancer on file.    Specialty Problems          Dermatology Problems    Dog bite       Past Dermatologic / Past Relevant Medical History:    - history of AKs  - moderately dysplastic junctional nevus on mid right lower back on 10/26/15  - mildly dysplastic junctional nevus on right upper back on 10/9/17  - mildly dysplastic junctional nevus on mid upper back diagnosed on 7/12/19  - no h/o skin cancer  - the patient reports a long history of eczema, for which she has used Ultravate on intermittent lesions for many years, but has been using the same tube for over 8 years and denies any active areas of eczema currently    Family History:    Father - nonmelanoma skin cancer  No family history of melanoma    Social History:    The patient works as a ; her , Juma, is a patient in our office as well, and her mother, Suha, was as well, but recently moved to [place]    Allergies:  Bupropion, Sulfa (sulfonamide antibiotics), and Sulfamethoxazole-trimethoprim    Current Medications / CAM's:    Current Outpatient Medications:     amLODIPine (Norvasc) 5 mg tablet, Take 1 tablet (5 mg) by mouth once daily., Disp: 90 tablet, Rfl: 3    atorvastatin (Lipitor) 10 mg tablet, Take 1 tablet (10 mg) by mouth once daily., Disp: 90 tablet, Rfl: 3    escitalopram (Lexapro) 20 mg tablet, Take 2 tablets (40 mg) by mouth once daily., Disp: 180 tablet, Rfl: 3    estradiol (Estrace) 0.01  % (0.1 mg/gram) vaginal cream, Insert 0.125 Applicatorfuls (0.5 g) into the vagina 3 times a week. Apply pea sized amount to vaginal opening every Monday, Wednesday, and Friday evening, Disp: 42.5 g, Rfl: 3    ketoconazole (NIZOral) 2 % cream, Apply twice daily to affected areas of face, Disp: 60 g, Rfl: 11    nicotine (Nicoderm CQ) 7 mg/24 hr patch, Place 1 patch on the skin once daily., Disp: , Rfl:     traZODone (Desyrel) 100 mg tablet, TAKE 2 TABLETS ONCE DAILY  AT BEDTIME, Disp: 180 tablet, Rfl: 3    tretinoin (Retin-A) 0.025 % cream, Apply 1 Application topically once daily at bedtime. Start 1-2 nights per week 1-2 weeks, inc to every other night, then every night as tolerated, Disp: 45 g, Rfl: 11    triamterene-hydrochlorothiazid (Maxzide-25) 37.5-25 mg tablet, Take 1 tablet by mouth once daily., Disp: 90 tablet, Rfl: 3    zolpidem (Ambien) 10 mg tablet, Take 1 tablet (10 mg) by mouth as needed at bedtime for sleep., Disp: 30 tablet, Rfl: 0     Objective   Well appearing patient in no apparent distress; mood and affect are within normal limits.    A full examination was performed including scalp, face, eyes, ears, nose, lips, neck, chest, axillae, abdomen, back, bilateral upper extremities, and bilateral lower extremities. All findings within normal limits unless otherwise noted below.    Assessment/Plan   1. Neoplasm of uncertain behavior of skin (2)  Right Dorsal Mid-Forearm  5 mm dark brown pigmented, asymmetric macule with an asymmetric pigment network and irregular borders           Shave removal    Lesion length (cm):  0.5  Margin per side (cm):  0.2  Lesion diameter (cm):  0.9  Informed consent: discussed and consent obtained    Timeout: patient name, date of birth, surgical site, and procedure verified    Procedure prep:  Patient was prepped and draped  Anesthesia: the lesion was anesthetized in a standard fashion    Anesthetic:  1% lidocaine w/ epinephrine 1-100,000 local infiltration  Instrument  used: flexible razor blade    Hemostasis achieved with: aluminum chloride    Outcome: patient tolerated procedure well    Post-procedure details: sterile dressing applied and wound care instructions given    Dressing type: bandage and petrolatum      Staff Communication: Dermatology Local Anesthesia: 1 % Lidocaine / Epinephrine - Amount:0.5ml    Specimen 1 - Dermatopathology- DERM LAB  Differential Diagnosis: DN  Check Margins Yes/No?:    Comments:    Dermpath Lab: Routine Histopathology (formalin-fixed tissue)    Left Medial Mid-Back  4 mm dark brown pigmented, asymmetric macule with an asymmetric pigment network and irregular borders           Shave removal    Lesion length (cm):  0.4  Margin per side (cm):  0.2  Lesion diameter (cm):  0.8  Informed consent: discussed and consent obtained    Timeout: patient name, date of birth, surgical site, and procedure verified    Procedure prep:  Patient was prepped and draped  Anesthesia: the lesion was anesthetized in a standard fashion    Anesthetic:  1% lidocaine w/ epinephrine 1-100,000 local infiltration  Instrument used: flexible razor blade    Hemostasis achieved with: aluminum chloride    Outcome: patient tolerated procedure well    Post-procedure details: sterile dressing applied and wound care instructions given    Dressing type: bandage and petrolatum      Staff Communication: Dermatology Local Anesthesia: 1 % Lidocaine / Epinephrine - Amount:0.5ml    Specimen 2 - Dermatopathology- DERM LAB  Differential Diagnosis: DN  Check Margins Yes/No?:    Comments:    Dermpath Lab: Routine Histopathology (formalin-fixed tissue)    2. History of dysplastic nevus  On the patient's mid right lower back, right upper back, and mid upper back, there are well-healed scars with no evidence of recurrent growth or pigmentation on exam today.    History of dysplastic nevi and actinic keratoses and photodamage.  There is no evidence of recurrence on exam today.  I emphasized the  importance of continuing to perform monthly self-skin exams using the ABCDs of monitoring moles, which were reviewed with the patient, as well as the importance of sun avoidance and sun protection with daily sunscreen use.  I will have the patient return to our office in 1 year, pending the above biopsy results, for routine follow-up and skin exam, and the patient was instructed to call our office should the patient notice any new, changing, symptomatic, or otherwise concerning skin lesions before then.  The patient expressed understanding and is in agreement with this plan.    Related Procedures  Follow Up In Dermatology - Established Patient    3. Actinic keratosis (5)  Left Forearm - Anterior (5)  Scattered on the patient's bilateral dorsal forearms, there are 5 erythematous, gritty, scaly macules     Actinic Keratoses -scattered on bilateral dorsal forearms.  The pre-cancerous nature of these lesions and treatment options were discussed with the patient today.  At this time, I recommend treatment with liquid nitrogen cryotherapy.  The patient expressed understanding, is in agreement with this plan, and wishes to proceed with cryotherapy today.    Destr of lesion - Left Forearm - Anterior (5)  Complexity: simple    Destruction method: cryotherapy    Informed consent: discussed and consent obtained    Lesion destroyed using liquid nitrogen: Yes    Cryotherapy cycles:  1  Outcome: patient tolerated procedure well with no complications    Post-procedure details: wound care instructions given      4. Melanocytic nevus of trunk  Scattered on the patient's face, neck, trunk, and extremities, there are multiple small, round- to oval-shaped, brown-pigmented and pink-colored, symmetric, uniform-appearing macules and dome-shaped papules    Clinically benign- to slightly atypical-appearing nevi - the clinically benign- to slightly atypical-appearing nature of the remainder of the patient's nevi was discussed with the  patient today.  None of the patient's nevi, with the exception of the 2 noted above, meet threshold for biopsy today.  I emphasized the importance of performing monthly self-skin exams using the ABCDs of monitoring moles, which were reviewed with the patient today and an informational hand-out provided.  I also emphasized the importance of sun avoidance and sun protection with daily sunscreen use.    5. Seborrheic keratosis  Scattered on the patient's face, neck, trunk, and extremities, there are multiple tan- to light brown-colored, hyperkeratotic, stuck-on appearing papules of varying size and shape    Seborrheic Keratoses - the benign nature of these lesions was discussed with the patient today and reassurance provided.  No treatment is medically indicated for these lesions at this time.    6. Lentigo  Photodistributed  Multiple tan- to light brown-colored, round- to oval-shaped, symmetric and uniform-appearing macules and small patches consistent with lentigines scattered in sun-exposed areas.    Solar Lentigines and photodamage.  The clinically benign-appearing nature of these lesions and their relation to chronic sun exposure were discussed with the patient today and reassurance provided.  None of these lesions meet threshold for biopsy today, and thus no treatment is medically indicated for these lesions at this time.  The signs and symptoms of skin cancer were reviewed and the patient was advised to practice sun protection and sun avoidance, use daily sunscreen, and perform regular self skin exams.  The patient was instructed to monitor these lesions for any changes, such as in size, shape, or color, or associated symptoms and to call our office to schedule a return visit for re-evaluation if any such changes or symptoms are noticed in the future.  The patient expressed understanding and is in agreement with this plan.    7. Seborrheic dermatitis  Head - Anterior (Face)  On the patient's face, mainly the  glabella and bilateral eyebrows and perinasal creases, there are pink, scaly patches with whitish-yellowish, greasy scale    Seborrheic Dermatitis - flare on face.  The potentially chronic and intermittently flaring nature of this condition and treatment options were discussed extensively with the patient today.  At this time, I recommend topical anti-fungal therapy with Ketoconazole 2% cream, which the patient was instructed to apply twice daily to the affected areas of the face.  The risks, benefits, and side effects of this medication were discussed.  The patient expressed understanding and is in agreement with this plan.    ketoconazole (NIZOral) 2 % cream - Head - Anterior (Face)  Apply twice daily to affected areas of face    8. Diffuse photodamage of skin  Photodistributed  Diffuse photodamage with actinic changes with telangiectasia and mottled pigmentation in sun-exposed areas.    Photodamage.  The signs and symptoms of skin cancer were reviewed and the patient was advised to practice sun protection and sun avoidance, use daily sunscreen, and perform regular self skin exams.  In addition, the patient wished to discuss possible medical treatment for photoaging.  Thus, at this time, I recommend the patient begin topical retinoid therapy with Tretinoin 0.025% cream at night.  The risks, benefits, and side effects of this medication, including the redness, dryness, and irritation expected with its use, were discussed; the patient was instructed to begin use of Tretinoin 1-2 nights per week and increase to every other night, then every night as tolerated without excessive dryness and irritation.  The patient was also informed this medication will likely be considered cosmetic and thus may not be covered by their insurance company and require out-of-pocket payment.  The patient expressed understanding and is in agreement with this plan.    tretinoin (Retin-A) 0.025 % cream - Photodistributed  Apply 1 Application  topically once daily at bedtime. Start 1-2 nights per week 1-2 weeks, inc to every other night, then every night as tolerated

## 2024-10-17 LAB
LAB AP ASR DISCLAIMER: NORMAL
LABORATORY COMMENT REPORT: NORMAL
PATH REPORT.FINAL DX SPEC: NORMAL
PATH REPORT.GROSS SPEC: NORMAL
PATH REPORT.MICROSCOPIC SPEC OTHER STN: NORMAL
PATH REPORT.RELEVANT HX SPEC: NORMAL
PATH REPORT.TOTAL CANCER: NORMAL

## 2024-11-18 DIAGNOSIS — G47.00 INSOMNIA, UNSPECIFIED TYPE: ICD-10-CM

## 2024-11-18 RX ORDER — ZOLPIDEM TARTRATE 10 MG/1
10 TABLET ORAL NIGHTLY PRN
Qty: 30 TABLET | Refills: 0 | Status: SHIPPED | OUTPATIENT
Start: 2024-11-18

## 2024-11-26 DIAGNOSIS — I10 HYPERTENSION, UNSPECIFIED TYPE: ICD-10-CM

## 2024-11-27 RX ORDER — AMLODIPINE BESYLATE 5 MG/1
5 TABLET ORAL DAILY
Qty: 90 TABLET | Refills: 3 | OUTPATIENT
Start: 2024-11-27

## 2024-12-13 ENCOUNTER — HOSPITAL ENCOUNTER (OUTPATIENT)
Dept: RADIOLOGY | Facility: CLINIC | Age: 64
Discharge: HOME | End: 2024-12-13
Payer: COMMERCIAL

## 2024-12-13 DIAGNOSIS — Z87.891 PERSONAL HISTORY OF NICOTINE DEPENDENCE: ICD-10-CM

## 2024-12-13 PROCEDURE — 71271 CT THORAX LUNG CANCER SCR C-: CPT

## 2025-01-07 ENCOUNTER — OFFICE VISIT (OUTPATIENT)
Dept: SURGERY | Facility: CLINIC | Age: 65
End: 2025-01-07
Payer: COMMERCIAL

## 2025-01-07 VITALS
SYSTOLIC BLOOD PRESSURE: 106 MMHG | DIASTOLIC BLOOD PRESSURE: 61 MMHG | BODY MASS INDEX: 18.84 KG/M2 | HEART RATE: 87 BPM | WEIGHT: 99.8 LBS | OXYGEN SATURATION: 96 % | TEMPERATURE: 96.1 F | HEIGHT: 61 IN

## 2025-01-07 DIAGNOSIS — R91.8 LUNG NODULES: Primary | ICD-10-CM

## 2025-01-07 PROCEDURE — 99205 OFFICE O/P NEW HI 60 MIN: CPT | Performed by: STUDENT IN AN ORGANIZED HEALTH CARE EDUCATION/TRAINING PROGRAM

## 2025-01-07 PROCEDURE — 4004F PT TOBACCO SCREEN RCVD TLK: CPT | Performed by: STUDENT IN AN ORGANIZED HEALTH CARE EDUCATION/TRAINING PROGRAM

## 2025-01-07 PROCEDURE — 99215 OFFICE O/P EST HI 40 MIN: CPT | Mod: 57 | Performed by: STUDENT IN AN ORGANIZED HEALTH CARE EDUCATION/TRAINING PROGRAM

## 2025-01-07 PROCEDURE — 3008F BODY MASS INDEX DOCD: CPT | Performed by: STUDENT IN AN ORGANIZED HEALTH CARE EDUCATION/TRAINING PROGRAM

## 2025-01-07 RX ORDER — HEPARIN SODIUM 5000 [USP'U]/ML
5000 INJECTION, SOLUTION INTRAVENOUS; SUBCUTANEOUS ONCE
OUTPATIENT
Start: 2025-01-07 | End: 2025-01-07

## 2025-01-07 RX ORDER — CEFAZOLIN SODIUM 2 G/100ML
2 INJECTION, SOLUTION INTRAVENOUS ONCE
OUTPATIENT
Start: 2025-01-07 | End: 2025-01-07

## 2025-01-07 NOTE — H&P (VIEW-ONLY)
Chief complaint:  Lung nodule/GGO    History Of Present Illness  Gifty Shaw is a 64 y.o. female, current smoker (50 years, approx 1/2 ppd - 25 pack year hx), presenting with a right apical spiculated subsolid lesion. Patient was referred by Dr. Colon, PCP.     Patient underwent LDCT in Dec 2024, this showed bilateral apical scarring vs lesions but with a prominent right apical subsolid 1.6cm lesion concerning for malignancy/adenocarcinoma spectral lesion. She denies any new cough, congestion, SOB, or unexplained weight loss. Has unfortunately had diarrhea a couple weeks but no other physical complaints.     Retired .     No history of MI or CVA. Could walk a mile or climb 2 flights of stairs: yes     Past Medical History  She has a past medical history of Eczema, Encounter for gynecological examination (general) (routine) without abnormal findings (05/10/2014), Encounter for screening for malignant neoplasm of vagina, Essential (primary) hypertension (08/01/2013), Other conditions influencing health status, Personal history of other endocrine, nutritional and metabolic disease, Personal history of other medical treatment, and Personal history of other mental and behavioral disorders (08/20/2013).    Surgical History  She has no past surgical history on file.     Social History  She reports that she has been smoking cigarettes. She started smoking about 40 years ago. She has a 40 pack-year smoking history. She has never used smokeless tobacco. She reports current alcohol use of about 2.0 standard drinks of alcohol per week. She reports that she does not use drugs.    Family History  Family history of cancer: No  Family History   Problem Relation Name Age of Onset    Other (cardiac disorder) Father      Colon cancer Other      Hypertension Other          Medications    Current Outpatient Medications:     amLODIPine (Norvasc) 5 mg tablet, Take 1 tablet (5 mg) by mouth once daily., Disp: 90 tablet,  Rfl: 3    atorvastatin (Lipitor) 10 mg tablet, Take 1 tablet (10 mg) by mouth once daily., Disp: 90 tablet, Rfl: 3    escitalopram (Lexapro) 20 mg tablet, Take 2 tablets (40 mg) by mouth once daily., Disp: 180 tablet, Rfl: 3    estradiol (Estrace) 0.01 % (0.1 mg/gram) vaginal cream, Insert 0.125 Applicatorfuls (0.5 g) into the vagina 3 times a week. Apply pea sized amount to vaginal opening every Monday, Wednesday, and Friday evening, Disp: 42.5 g, Rfl: 3    ketoconazole (NIZOral) 2 % cream, Apply twice daily to affected areas of face, Disp: 60 g, Rfl: 11    nicotine (Nicoderm CQ) 7 mg/24 hr patch, Place 1 patch on the skin once daily., Disp: , Rfl:     traZODone (Desyrel) 100 mg tablet, TAKE 2 TABLETS ONCE DAILY  AT BEDTIME, Disp: 180 tablet, Rfl: 3    tretinoin (Retin-A) 0.025 % cream, Apply 1 Application topically once daily at bedtime. Start 1-2 nights per week 1-2 weeks, inc to every other night, then every night as tolerated, Disp: 45 g, Rfl: 11    triamterene-hydrochlorothiazid (Maxzide-25) 37.5-25 mg tablet, Take 1 tablet by mouth once daily., Disp: 90 tablet, Rfl: 3    zolpidem (Ambien) 10 mg tablet, Take 1 tablet (10 mg) by mouth as needed at bedtime for sleep., Disp: 30 tablet, Rfl: 0    Allergies  Bupropion, Sulfa (sulfonamide antibiotics), and Sulfamethoxazole-trimethoprim    Review of Systems:  Review of Systems   Constitutional: No fevers, chills, unexpected weight change  HENT: No sore throat, congestion, or nasal drainage  Eyes: No visual changes or eye itching  Respiratory: see HPI. No cough, worsening dyspnea, wheezing  Cardiac: No chest pain, palpitations, or lower extremity edema  Gastrointestinal: No nausea, vomiting, diarrhea. No abdominal pain  Genitourinary: No dysuria or hematuria  Musculoskeletal: No back pain. No significant myalgias or arthralgias  Neurologic: No headaches, dizziness, or seizures.  Hematologic: No easy bleeding or bruising.  Psychiatric: No anxiety or  "depression.    Physical Exam:  Physical Exam  /61   Pulse 87   Temp 35.6 °C (96.1 °F)   Ht 1.549 m (5' 1\")   Wt 45.3 kg (99 lb 12.8 oz)   LMP 01/01/2004 (Approximate)   SpO2 96%   BMI 18.86 kg/m²   Constitutional:       General: Patient is not in acute distress.     Appearance: Normal appearance; not ill-appearing.   HENT:      Head: Normocephalic.      Nose: No congestion or rhinorrhea.   Cardiovascular:      Rate and Rhythm: Normal rate and regular rhythm.      Pulses: Normal pulses.   Pulmonary:      Effort: Pulmonary effort is normal. No respiratory distress.  No conversational dyspnea     Breath sounds: No stridor. No wheezing.   Abdominal:      General: There is no distension.      Palpations: Abdomen is soft.      Tenderness: There is no abdominal tenderness.   Musculoskeletal:         General: No swelling, tenderness or deformity. Normal range of motion.      Cervical back: Normal range of motion. No rigidity.   Lymphadenopathy:      Cervical: No cervical adenopathy.   Skin:     General: Skin is warm and dry.   Neurological:      General: No focal deficit present.      Mental Status: Patient is alert and oriented to person, place, and time.   Psychiatric:         Mood and Affect: Mood normal.     Relevant Results    Pathology:  N/A     Imaging:    CT lung screening low dose    Result Date: 12/13/2024  Interpreted By:  Jeremias Marte, STUDY: CT LUNG SCREENING LOW DOSE; 12/13/2024 10:27 am   INDICATION: Smoker screening   COMPARISON: No prior   ACCESSION NUMBER(S): [id number]   ORDERING CLINICIAN: GORDON BERGER   TECHNIQUE: Helical data acquisition of the chest was obtained without IV contrast material.  Images were reformatted in axial, coronal, and sagittal planes.   FINDINGS: LUNGS AND AIRWAYS: No trino emphysema. Mild generalized airway thickening. No dense consolidation, mass or central airway obstruction. Benign coarsely calcified granuloma in the right lower lobe. There sub-solid nodular " densities at both apices which are worrisome for multifocal lesions in the lung adenocarcinoma spectrum. Most worrisome at the right apex is 1.6 x 1.5 cm on series 3, image 67 with a solid component at the subpleural lateral right apex which measures up to 6-7 mm on image 62. Other areas of ground-glass nodularity in both apices, including at the right apex measuring up to 1.5 cm and other small nodules annotated for reference on the exam. Numerous other scattered less than 5 mm ground-glass nodular densities elsewhere bilaterally, some of which are annotated for reference on the exam   MEDIASTINUM AND JANAK, LOWER NECK AND AXILLA: No intrathoracic lymphadenopathy. The esophagus is not substantially dilated. No masses are identified in the lower neck   HEART AND VESSELS: Normal heart size. No pericardial effusion. Normal caliber thoracic aorta and pulmonary trunk. No coronary artery calcifications are identified. Coarse calcification at the level of the aortic valve leaflets (series 4, image 169).   UPPER ABDOMEN: No acute upper abdominal finding is identified   CHEST WALL AND OSSEOUS STRUCTURES: No significant soft tissue findings. No lytic or blastic osseous lesion is identified. Small partially calcified lymph node in the right axillary region. Remote fracture deformities of the posterior right 10th and 11th ribs.       1. Findings most compatible with multifocal lesions in the lung adenocarcinoma spectrum, most worrisome at the right apex is 1.6 x 1.5 cm with 6-7 mm solid component, most likely an invasive primary lung adenocarcinoma. This lesion would not be reliably assessed by PET CT given small size of the solid component, therefore strong potential for false negative. Advise surgical consultation for resection. Histologic sampling could be considered depending on the clinical scenario, but a negative biopsy result would be presumed to be discordant. The other nodule densities will require ongoing  surveillance. 2. Estimated coronary artery calcium score is 0* which correlates with at least 0th percentile rank as compared to matched CELIS-study subjects(https://www.celis-nhlbi.org/Calcium/input.aspx). 3. Coarse aortic valve leaflet calcification. Advise correlation with an echocardiogram to exclude any degree of significant associated calcific aortic valvular stenosis.   LUNG RADS CATEGORY: Lung Rad: Lung-RADS 4X, S (Very Suspicious) Recommendation: Further evaluation with diagnostic chest CT with or without contrast, may consider PET/CT if solid component is greater than or equal to 8mm (268 mm3) or tissue sampling depending on probability of malignancy, recommended as per American College of Radiology Guidelines Lung-RADS Version 2022. Management depends on clinical evaluation, patient preference, and the probability of malignancy (https://[email or web address].ca/lung-cancer-screening-and-[email or web address]-prediction/risk-calc ulators/) Recommend F/U with Thoracic Surgeon. Lung-RADS S (Other non-nodular findings) Management as appropriate to finding per American College of Radiology Guidelines Lung-RADS Version 2022.   A yellow alert in epic was placed to ensure clinician receipt of this abnormal result and recommendation for surgical consultation for resection of a presumed invasive primary lung cancer in the right apex. Please note that ongoing surveillance will be required for additional bilateral sub-solid nodular densities which are not actionable at this time. Finding entered by Dr. Marte at 8:58 p.m. on 12/13/2024.   **The patient's CAC score was measured with an FDA-cleared AI tool that correlates well with traditional methods. However, due to the non-gated CT scan and new algorithm, AI-powered scores should not replace traditional cardiovascular risk assessment. For further assistance, refer to the [facility] Cardiovascular Prevention Program via an [employer] referral to 'Cardiology Prevention Program.'   MACRO: None   Signed  "by: Jeremias Marte 12/13/2024 9:00 PM Dictation workstation:   [id number]       Pulmonary Functions Testing Results:    No results found for: \"FEV1\", \"FVC\", \"ZPR1XPT\", \"TLC\", \"DLCO\"    CT chest personally reviewed     Assessment/Plan   Problem List Items Addressed This Visit    None  Visit Diagnoses         Codes    Lung nodules     R91.8    Relevant Orders    Spirometry Pre/Post Bronchodilator    DLCO / Diffusion Capacity    CBC    Comprehensive Metabolic Panel    Protime-INR    Type And Screen            Ms. Shaw is a pleasant 64 year old female with a right apical 1.6cm subsolid lung nodule concerning for adenoCA/spectral lesion. We discussed the other ddx as well. Given her smoking hx and pre-test probability we discussed proceeding straight to surgical wedge if her PFTs are adequate, she is completely agreeable. Will arrange for lung function tests, labs at [facility] and surgery when she returns from her cruise on 2/3 at [facility].        I spent 75 minutes in the professional and overall care of this patient.      Marlena Landin, DO  Thoracic & Esophageal Surgery     "

## 2025-01-07 NOTE — PROGRESS NOTES
Chief complaint:  Lung nodule/GGO    History Of Present Illness  Gifty Shaw is a 64 y.o. female, current smoker (50 years, approx 1/2 ppd - 25 pack year hx), presenting with a right apical spiculated subsolid lesion. Patient was referred by Dr. Colon, PCP.     Patient underwent LDCT in Dec 2024, this showed bilateral apical scarring vs lesions but with a prominent right apical subsolid 1.6cm lesion concerning for malignancy/adenocarcinoma spectral lesion. She denies any new cough, congestion, SOB, or unexplained weight loss. Has unfortunately had diarrhea a couple weeks but no other physical complaints.     Retired .     No history of MI or CVA. Could walk a mile or climb 2 flights of stairs: yes     Past Medical History  She has a past medical history of Eczema, Encounter for gynecological examination (general) (routine) without abnormal findings (05/10/2014), Encounter for screening for malignant neoplasm of vagina, Essential (primary) hypertension (08/01/2013), Other conditions influencing health status, Personal history of other endocrine, nutritional and metabolic disease, Personal history of other medical treatment, and Personal history of other mental and behavioral disorders (08/20/2013).    Surgical History  She has no past surgical history on file.     Social History  She reports that she has been smoking cigarettes. She started smoking about 40 years ago. She has a 40 pack-year smoking history. She has never used smokeless tobacco. She reports current alcohol use of about 2.0 standard drinks of alcohol per week. She reports that she does not use drugs.    Family History  Family history of cancer: No  Family History   Problem Relation Name Age of Onset    Other (cardiac disorder) Father      Colon cancer Other      Hypertension Other          Medications    Current Outpatient Medications:     amLODIPine (Norvasc) 5 mg tablet, Take 1 tablet (5 mg) by mouth once daily., Disp: 90 tablet,  Rfl: 3    atorvastatin (Lipitor) 10 mg tablet, Take 1 tablet (10 mg) by mouth once daily., Disp: 90 tablet, Rfl: 3    escitalopram (Lexapro) 20 mg tablet, Take 2 tablets (40 mg) by mouth once daily., Disp: 180 tablet, Rfl: 3    estradiol (Estrace) 0.01 % (0.1 mg/gram) vaginal cream, Insert 0.125 Applicatorfuls (0.5 g) into the vagina 3 times a week. Apply pea sized amount to vaginal opening every Monday, Wednesday, and Friday evening, Disp: 42.5 g, Rfl: 3    ketoconazole (NIZOral) 2 % cream, Apply twice daily to affected areas of face, Disp: 60 g, Rfl: 11    nicotine (Nicoderm CQ) 7 mg/24 hr patch, Place 1 patch on the skin once daily., Disp: , Rfl:     traZODone (Desyrel) 100 mg tablet, TAKE 2 TABLETS ONCE DAILY  AT BEDTIME, Disp: 180 tablet, Rfl: 3    tretinoin (Retin-A) 0.025 % cream, Apply 1 Application topically once daily at bedtime. Start 1-2 nights per week 1-2 weeks, inc to every other night, then every night as tolerated, Disp: 45 g, Rfl: 11    triamterene-hydrochlorothiazid (Maxzide-25) 37.5-25 mg tablet, Take 1 tablet by mouth once daily., Disp: 90 tablet, Rfl: 3    zolpidem (Ambien) 10 mg tablet, Take 1 tablet (10 mg) by mouth as needed at bedtime for sleep., Disp: 30 tablet, Rfl: 0    Allergies  Bupropion, Sulfa (sulfonamide antibiotics), and Sulfamethoxazole-trimethoprim    Review of Systems:  Review of Systems   Constitutional: No fevers, chills, unexpected weight change  HENT: No sore throat, congestion, or nasal drainage  Eyes: No visual changes or eye itching  Respiratory: see HPI. No cough, worsening dyspnea, wheezing  Cardiac: No chest pain, palpitations, or lower extremity edema  Gastrointestinal: No nausea, vomiting, diarrhea. No abdominal pain  Genitourinary: No dysuria or hematuria  Musculoskeletal: No back pain. No significant myalgias or arthralgias  Neurologic: No headaches, dizziness, or seizures.  Hematologic: No easy bleeding or bruising.  Psychiatric: No anxiety or  "depression.    Physical Exam:  Physical Exam  /61   Pulse 87   Temp 35.6 °C (96.1 °F)   Ht 1.549 m (5' 1\")   Wt 45.3 kg (99 lb 12.8 oz)   LMP 01/01/2004 (Approximate)   SpO2 96%   BMI 18.86 kg/m²   Constitutional:       General: Patient is not in acute distress.     Appearance: Normal appearance; not ill-appearing.   HENT:      Head: Normocephalic.      Nose: No congestion or rhinorrhea.   Cardiovascular:      Rate and Rhythm: Normal rate and regular rhythm.      Pulses: Normal pulses.   Pulmonary:      Effort: Pulmonary effort is normal. No respiratory distress.  No conversational dyspnea     Breath sounds: No stridor. No wheezing.   Abdominal:      General: There is no distension.      Palpations: Abdomen is soft.      Tenderness: There is no abdominal tenderness.   Musculoskeletal:         General: No swelling, tenderness or deformity. Normal range of motion.      Cervical back: Normal range of motion. No rigidity.   Lymphadenopathy:      Cervical: No cervical adenopathy.   Skin:     General: Skin is warm and dry.   Neurological:      General: No focal deficit present.      Mental Status: Patient is alert and oriented to person, place, and time.   Psychiatric:         Mood and Affect: Mood normal.     Relevant Results    Pathology:  N/A     Imaging:    CT lung screening low dose    Result Date: 12/13/2024  Interpreted By:  Jeremias Marte, STUDY: CT LUNG SCREENING LOW DOSE; 12/13/2024 10:27 am   INDICATION: Smoker screening   COMPARISON: No prior   ACCESSION NUMBER(S): [id number]   ORDERING CLINICIAN: GORDON BERGER   TECHNIQUE: Helical data acquisition of the chest was obtained without IV contrast material.  Images were reformatted in axial, coronal, and sagittal planes.   FINDINGS: LUNGS AND AIRWAYS: No trino emphysema. Mild generalized airway thickening. No dense consolidation, mass or central airway obstruction. Benign coarsely calcified granuloma in the right lower lobe. There sub-solid nodular " densities at both apices which are worrisome for multifocal lesions in the lung adenocarcinoma spectrum. Most worrisome at the right apex is 1.6 x 1.5 cm on series 3, image 67 with a solid component at the subpleural lateral right apex which measures up to 6-7 mm on image 62. Other areas of ground-glass nodularity in both apices, including at the right apex measuring up to 1.5 cm and other small nodules annotated for reference on the exam. Numerous other scattered less than 5 mm ground-glass nodular densities elsewhere bilaterally, some of which are annotated for reference on the exam   MEDIASTINUM AND JANAK, LOWER NECK AND AXILLA: No intrathoracic lymphadenopathy. The esophagus is not substantially dilated. No masses are identified in the lower neck   HEART AND VESSELS: Normal heart size. No pericardial effusion. Normal caliber thoracic aorta and pulmonary trunk. No coronary artery calcifications are identified. Coarse calcification at the level of the aortic valve leaflets (series 4, image 169).   UPPER ABDOMEN: No acute upper abdominal finding is identified   CHEST WALL AND OSSEOUS STRUCTURES: No significant soft tissue findings. No lytic or blastic osseous lesion is identified. Small partially calcified lymph node in the right axillary region. Remote fracture deformities of the posterior right 10th and 11th ribs.       1. Findings most compatible with multifocal lesions in the lung adenocarcinoma spectrum, most worrisome at the right apex is 1.6 x 1.5 cm with 6-7 mm solid component, most likely an invasive primary lung adenocarcinoma. This lesion would not be reliably assessed by PET CT given small size of the solid component, therefore strong potential for false negative. Advise surgical consultation for resection. Histologic sampling could be considered depending on the clinical scenario, but a negative biopsy result would be presumed to be discordant. The other nodule densities will require ongoing  surveillance. 2. Estimated coronary artery calcium score is 0* which correlates with at least 0th percentile rank as compared to matched CELIS-study subjects(https://www.celis-nhlbi.org/Calcium/input.aspx). 3. Coarse aortic valve leaflet calcification. Advise correlation with an echocardiogram to exclude any degree of significant associated calcific aortic valvular stenosis.   LUNG RADS CATEGORY: Lung Rad: Lung-RADS 4X, S (Very Suspicious) Recommendation: Further evaluation with diagnostic chest CT with or without contrast, may consider PET/CT if solid component is greater than or equal to 8mm (268 mm3) or tissue sampling depending on probability of malignancy, recommended as per American College of Radiology Guidelines Lung-RADS Version 2022. Management depends on clinical evaluation, patient preference, and the probability of malignancy (https://[facility].ca/lung-cancer-screening-and-risk-prediction/risk-calc ulators/) Recommend F/U with Thoracic Surgeon. Lung-RADS S (Other non-nodular findings) Management as appropriate to finding per American College of Radiology Guidelines Lung-RADS Version 2022.   A yellow alert in epic was placed to ensure clinician receipt of this abnormal result and recommendation for surgical consultation for resection of a presumed invasive primary lung cancer in the right apex. Please note that ongoing surveillance will be required for additional bilateral sub-solid nodular densities which are not actionable at this time. Finding entered by Dr. Marte at 8:58 p.m. on 12/13/2024.   **The patient's CAC score was measured with an FDA-cleared AI tool that correlates well with traditional methods. However, due to the non-gated CT scan and new algorithm, AI-powered scores should not replace traditional cardiovascular risk assessment. For further assistance, refer to the [facility] Cardiovascular Prevention Program via an [employer] referral to 'Cardiology Prevention Program.'   MACRO: None   Signed  "by: Jeremias Marte 12/13/2024 9:00 PM Dictation workstation:   [id number]       Pulmonary Functions Testing Results:    No results found for: \"FEV1\", \"FVC\", \"JZR6FVL\", \"TLC\", \"DLCO\"    CT chest personally reviewed     Assessment/Plan   Problem List Items Addressed This Visit    None  Visit Diagnoses         Codes    Lung nodules     R91.8    Relevant Orders    Spirometry Pre/Post Bronchodilator    DLCO / Diffusion Capacity    CBC    Comprehensive Metabolic Panel    Protime-INR    Type And Screen            Ms. Shaw is a pleasant 64 year old female with a right apical 1.6cm subsolid lung nodule concerning for adenoCA/spectral lesion. We discussed the other ddx as well. Given her smoking hx and pre-test probability we discussed proceeding straight to surgical wedge if her PFTs are adequate, she is completely agreeable. Will arrange for lung function tests, labs at [facility] and surgery when she returns from her cruise on 2/3 at [facility].        I spent 75 minutes in the professional and overall care of this patient.      Marlena Landin, DO  Thoracic & Esophageal Surgery     "

## 2025-01-07 NOTE — PATIENT INSTRUCTIONS
Call the office with any questions [phone]     You are scheduled for surgery on  2/3/25  at  [facility].   You will need blood work only.   You will need to get this done anytime at [facility].     Dr Landin would like you to get Pulmonary Function Tests (PFT's).  You will get a call to schedule your PFT's at a location near you.       RESPIRATORY THERAPY PFT PREP     1.  Wear loose clothing so that your ability to take a deep breath is not restricted.  2.  You may eat a light meal before testing.  3.  No exercise 30 minutes prior to testing.   4.  Do not smoke for 8 hours prior to testing.  5.  No alcohol 4 hours prior.  6.  No caffeine (Coffee, Tea or Soda) 8 hours prior.  7.  Stop short acting inhaled breathing medicines such as Albuterol, Proventil, ventolin, Proair for 8 hours prior.  8.  Stop the following inhaled breathing medicines such as Atrovent and Combivent for 24 hours prior.  9.  Stop the following inhaled breathing medicines such as Serevent, Advair for 48 hours prior.  10. Stop the following inhaled breathing medicines: Spiriva and Anoro for 1 week prior to testing.   11.  Please call to reschedule if you have a bad cold or Upper Respiratory Infection or are running a fever the day of testing.    Approximate time 1 hour.         PRESURGICAL INSTRUCTIONS    **Please call the office with any questions.  [phone]      BLOOD WORK/LABS:  *If your surgeon does not want you to get PAT and has requested you to have blood work/labs drawn, get them done at least 3 days prior to surgery.  Your surgeon will give you instructions on where to have your labs drawn. Labs are good for 21 days prior to surgery date.      MEDICATIONS:  *Take  your regularly scheduled medications the morning of surgery with a sip of water unless instructed by your surgeon.  If you have any questions ask your surgeon.   Exceptions include:  **Blood thinners, (ie: Plavix, Xarelto, Eliquis, Brilinta) check with your surgeon about holding  for surgery. (Aspirin 81 mg is ok to take)   *Ibuprofen, multivitamins, fish oil, supplements- STOP 7 days before surgery.  *Lisinopril/ Losartan/Entresto - HOLD morning of surgery  *Diabetes medications (ie: metformin, glipizide) - HOLD morning of surgery  *Farxiga, Jardiance - Hold 3 days before surgery  *Monjaro, Ozempic, Trulicity, Wegovy, Tanzeum,  Bydyreon - HOLD 1 week prior to surgery    CONTACT SURGEON'S OFFICE IF YOU DEVELOP:  * Fever = 100.4 F   * New respiratory symptoms (e.g. cough, shortness of breath, respiratory distress, sore throat)  * Recent loss of taste or smell  *Flu like symptoms such as headache, fatigue or gastrointestinal symptoms  * You develop any open sores, shingles, burning or painful urination   AND/OR:  * You no longer wish to have the surgery.  * Any other personal circumstances change that may lead to the need to cancel or defer this surgery.  *You were admitted to any hospital within one week of your planned procedure.  *There have been any changes to your insurance, address, or phone number.     SMOKING:  *Stop smoking, using street drugs, or consuming excessive alcohol.   *Quitting smoking can make a huge difference to your health and recovery from surgery.    *If you need help with quitting, call 2-646-QUIT-NOW.       SURGICAL TIME/PARKING AND ARRIVAL:    Sue: You will be contacted between 2 p.m. and 6 p.m. the business day before your surgery with your arrival time. *If you haven't received a call by 6pm, call [phone].  Check in at the Main Entrance desk and let them know you are here for surgery.        *You will be directed to the 2nd floor surgical waiting area.    *Scheduled surgery times may change and you will be notified if this occurs-check your personal voicemail for any updates.    THE DAY BEFORE SURGERY:  *Do not eat any food after midnight the night before surgery.       *Shower with any antibacterial soap the night before and/or the morning of your  surgery.    **After showering, do NOT apply hair products, lotions, powders, creams, makeup, nail polish, Vaseline, or deodorant.**       ON THE MORNING OF SURGERY:  *Do not eat or drink anything.  This includes gum or candy.  *You may take your morning meds with a small sips of water.   DIABETICS:  Please check fasting blood sugar  upon waking up.  If fasting sugar is <80 mg/dl, please drink 100ml/3oz of apple juice no later than 2 hours prior to surgery.  *Brush your teeth before coming to the hospital.   *Do not use moisturizers, creams, lotions or perfume.  *Wear comfortable, loose fitting clothing.   *All jewelry and valuables should be left at home.  *Prosthetic devices such as contact lenses, hearing aids, dentures, eyelash extensions, hairpins and body piercing must be removed before surgery.     BRING WITH YOU:  *Photo ID and insurance card  *Current list of medicines and allergies Include dose and how often you take it.   *Pacemaker/Defibrillator/Heart stent cards  *CPAP machine and mask  *Slings/splints/crutches/walker  *Copy of your complete Advanced Directive/DHPOA-if applicable  *Neurostimulator implant remote       AFTER INPATIENT SURGERY:  *A responsible adult MUST accompany you at the time of discharge and stay with you for 24 hours after your surgery.  *You may NOT drive yourself home after surgery.  *You may use a taxi or ride sharing service ([employer], Uber) to return home ONLY if you are accompanied by a friend or family member.  *Instructions for resuming your medications will be provided by your surgeon.    FOLLOW UP:  *You will be scheduled for a post op follow up visit Dr. Landin about 2 weeks after you leave the hospital.    *You will likely need to get a Chest XRAY prior to seeing her.  There is no scheduled appointment time for this. You can get this done the same day as your appointment 30 min prior in Radiology ([facility] 1st floor/ [facility] 2nd floor).    *If there is  pathology this is usually resulted by this appointment for Dr. Landin to go over with you.      **For More Information about your condition or surgery visit [email or web address]

## 2025-01-13 ENCOUNTER — HOSPITAL ENCOUNTER (OUTPATIENT)
Dept: RESPIRATORY THERAPY | Facility: CLINIC | Age: 65
Discharge: HOME | End: 2025-01-13
Payer: COMMERCIAL

## 2025-01-13 DIAGNOSIS — R91.8 LUNG NODULES: ICD-10-CM

## 2025-01-13 PROCEDURE — 94729 DIFFUSING CAPACITY: CPT

## 2025-01-13 PROCEDURE — 94060 EVALUATION OF WHEEZING: CPT

## 2025-01-15 ENCOUNTER — LAB (OUTPATIENT)
Dept: LAB | Facility: LAB | Age: 65
End: 2025-01-15
Payer: COMMERCIAL

## 2025-01-15 DIAGNOSIS — R91.8 LUNG NODULES: ICD-10-CM

## 2025-01-15 LAB
ABO GROUP (TYPE) IN BLOOD: NORMAL
ALBUMIN SERPL BCP-MCNC: 4.4 G/DL (ref 3.4–5)
ALP SERPL-CCNC: 51 U/L (ref 33–136)
ALT SERPL W P-5'-P-CCNC: 21 U/L (ref 7–45)
ANION GAP SERPL CALC-SCNC: 13 MMOL/L (ref 10–20)
ANTIBODY SCREEN: NORMAL
AST SERPL W P-5'-P-CCNC: 28 U/L (ref 9–39)
BILIRUB SERPL-MCNC: 0.8 MG/DL (ref 0–1.2)
BUN SERPL-MCNC: 12 MG/DL (ref 6–23)
CALCIUM SERPL-MCNC: 10.2 MG/DL (ref 8.6–10.3)
CHLORIDE SERPL-SCNC: 94 MMOL/L (ref 98–107)
CO2 SERPL-SCNC: 29 MMOL/L (ref 21–32)
CREAT SERPL-MCNC: 0.71 MG/DL (ref 0.5–1.05)
EGFRCR SERPLBLD CKD-EPI 2021: >90 ML/MIN/1.73M*2
ERYTHROCYTE [DISTWIDTH] IN BLOOD BY AUTOMATED COUNT: 11.9 % (ref 11.5–14.5)
GLUCOSE SERPL-MCNC: 113 MG/DL (ref 74–99)
HCT VFR BLD AUTO: 43.1 % (ref 36–46)
HGB BLD-MCNC: 15.4 G/DL (ref 12–16)
INR PPP: 1 (ref 0.9–1.1)
MCH RBC QN AUTO: 33.3 PG (ref 26–34)
MCHC RBC AUTO-ENTMCNC: 35.7 G/DL (ref 32–36)
MCV RBC AUTO: 93 FL (ref 80–100)
NRBC BLD-RTO: 0 /100 WBCS (ref 0–0)
PLATELET # BLD AUTO: 335 X10*3/UL (ref 150–450)
POTASSIUM SERPL-SCNC: 3.1 MMOL/L (ref 3.5–5.3)
PROT SERPL-MCNC: 6.9 G/DL (ref 6.4–8.2)
PROTHROMBIN TIME: 10.9 SECONDS (ref 9.8–12.8)
RBC # BLD AUTO: 4.62 X10*6/UL (ref 4–5.2)
RH FACTOR (ANTIGEN D): NORMAL
SODIUM SERPL-SCNC: 133 MMOL/L (ref 136–145)
WBC # BLD AUTO: 12.2 X10*3/UL (ref 4.4–11.3)

## 2025-01-15 PROCEDURE — 86900 BLOOD TYPING SEROLOGIC ABO: CPT

## 2025-01-15 PROCEDURE — 85610 PROTHROMBIN TIME: CPT

## 2025-01-15 PROCEDURE — 85027 COMPLETE CBC AUTOMATED: CPT

## 2025-01-15 PROCEDURE — 86901 BLOOD TYPING SEROLOGIC RH(D): CPT

## 2025-01-15 PROCEDURE — 80053 COMPREHEN METABOLIC PANEL: CPT

## 2025-01-15 PROCEDURE — 86850 RBC ANTIBODY SCREEN: CPT

## 2025-01-31 ENCOUNTER — ANESTHESIA EVENT (OUTPATIENT)
Dept: OPERATING ROOM | Facility: HOSPITAL | Age: 65
End: 2025-01-31
Payer: COMMERCIAL

## 2025-02-03 ENCOUNTER — ANESTHESIA (OUTPATIENT)
Dept: OPERATING ROOM | Facility: HOSPITAL | Age: 65
End: 2025-02-03
Payer: COMMERCIAL

## 2025-02-03 ENCOUNTER — HOSPITAL ENCOUNTER (INPATIENT)
Facility: HOSPITAL | Age: 65
LOS: 3 days | Discharge: HOME | End: 2025-02-06
Attending: STUDENT IN AN ORGANIZED HEALTH CARE EDUCATION/TRAINING PROGRAM | Admitting: STUDENT IN AN ORGANIZED HEALTH CARE EDUCATION/TRAINING PROGRAM
Payer: COMMERCIAL

## 2025-02-03 ENCOUNTER — APPOINTMENT (OUTPATIENT)
Dept: RADIOLOGY | Facility: HOSPITAL | Age: 65
End: 2025-02-03
Payer: COMMERCIAL

## 2025-02-03 DIAGNOSIS — J44.9 COPD, MILD (MULTI): ICD-10-CM

## 2025-02-03 DIAGNOSIS — R91.8 LUNG NODULES: Primary | ICD-10-CM

## 2025-02-03 PROBLEM — L30.9 ECZEMA: Status: ACTIVE | Noted: 2025-02-03

## 2025-02-03 PROBLEM — I10 HTN (HYPERTENSION): Status: ACTIVE | Noted: 2025-02-03

## 2025-02-03 LAB
ABO GROUP (TYPE) IN BLOOD: NORMAL
RH FACTOR (ANTIGEN D): NORMAL

## 2025-02-03 PROCEDURE — 7100000002 HC RECOVERY ROOM TIME - EACH INCREMENTAL 1 MINUTE: Performed by: STUDENT IN AN ORGANIZED HEALTH CARE EDUCATION/TRAINING PROGRAM

## 2025-02-03 PROCEDURE — 32666 THORACOSCOPY W/WEDGE RESECT: CPT | Performed by: STUDENT IN AN ORGANIZED HEALTH CARE EDUCATION/TRAINING PROGRAM

## 2025-02-03 PROCEDURE — 71045 X-RAY EXAM CHEST 1 VIEW: CPT

## 2025-02-03 PROCEDURE — 88331 PATH CONSLTJ SURG 1 BLK 1SPC: CPT | Mod: TC,SUR,AHULAB | Performed by: PATHOLOGY

## 2025-02-03 PROCEDURE — 88331 PATH CONSLTJ SURG 1 BLK 1SPC: CPT | Performed by: PATHOLOGY

## 2025-02-03 PROCEDURE — 81458 SO GSAP DNA CPY NMBR&MCRSTL: CPT | Performed by: STUDENT IN AN ORGANIZED HEALTH CARE EDUCATION/TRAINING PROGRAM

## 2025-02-03 PROCEDURE — 88307 TISSUE EXAM BY PATHOLOGIST: CPT | Performed by: STUDENT IN AN ORGANIZED HEALTH CARE EDUCATION/TRAINING PROGRAM

## 2025-02-03 PROCEDURE — 7100000001 HC RECOVERY ROOM TIME - INITIAL BASE CHARGE: Performed by: STUDENT IN AN ORGANIZED HEALTH CARE EDUCATION/TRAINING PROGRAM

## 2025-02-03 PROCEDURE — 2500000001 HC RX 250 WO HCPCS SELF ADMINISTERED DRUGS (ALT 637 FOR MEDICARE OP): Performed by: NURSE PRACTITIONER

## 2025-02-03 PROCEDURE — P9045 ALBUMIN (HUMAN), 5%, 250 ML: HCPCS | Mod: JZ | Performed by: ANESTHESIOLOGY

## 2025-02-03 PROCEDURE — A32666 PR THORACOSCOPY W/THERA WEDGE RESEXN INITIAL UNILAT: Performed by: ANESTHESIOLOGIST ASSISTANT

## 2025-02-03 PROCEDURE — 9420000001 HC RT PATIENT EDUCATION 5 MIN

## 2025-02-03 PROCEDURE — 2500000004 HC RX 250 GENERAL PHARMACY W/ HCPCS (ALT 636 FOR OP/ED): Mod: JZ | Performed by: ANESTHESIOLOGIST ASSISTANT

## 2025-02-03 PROCEDURE — 71045 X-RAY EXAM CHEST 1 VIEW: CPT | Performed by: RADIOLOGY

## 2025-02-03 PROCEDURE — A32666 PR THORACOSCOPY W/THERA WEDGE RESEXN INITIAL UNILAT: Performed by: ANESTHESIOLOGY

## 2025-02-03 PROCEDURE — 2780000003 HC OR 278 NO HCPCS: Performed by: STUDENT IN AN ORGANIZED HEALTH CARE EDUCATION/TRAINING PROGRAM

## 2025-02-03 PROCEDURE — 3700000001 HC GENERAL ANESTHESIA TIME - INITIAL BASE CHARGE: Performed by: STUDENT IN AN ORGANIZED HEALTH CARE EDUCATION/TRAINING PROGRAM

## 2025-02-03 PROCEDURE — 3600000004 HC OR TIME - INITIAL BASE CHARGE - PROCEDURE LEVEL FOUR: Performed by: STUDENT IN AN ORGANIZED HEALTH CARE EDUCATION/TRAINING PROGRAM

## 2025-02-03 PROCEDURE — 07B70ZZ EXCISION OF THORAX LYMPHATIC, OPEN APPROACH: ICD-10-PCS | Performed by: STUDENT IN AN ORGANIZED HEALTH CARE EDUCATION/TRAINING PROGRAM

## 2025-02-03 PROCEDURE — 88381 MICRODISSECTION MANUAL: CPT | Performed by: STUDENT IN AN ORGANIZED HEALTH CARE EDUCATION/TRAINING PROGRAM

## 2025-02-03 PROCEDURE — G0452 MOLECULAR PATHOLOGY INTERPR: HCPCS | Performed by: STUDENT IN AN ORGANIZED HEALTH CARE EDUCATION/TRAINING PROGRAM

## 2025-02-03 PROCEDURE — 2500000004 HC RX 250 GENERAL PHARMACY W/ HCPCS (ALT 636 FOR OP/ED): Performed by: NURSE PRACTITIONER

## 2025-02-03 PROCEDURE — 3700000002 HC GENERAL ANESTHESIA TIME - EACH INCREMENTAL 1 MINUTE: Performed by: STUDENT IN AN ORGANIZED HEALTH CARE EDUCATION/TRAINING PROGRAM

## 2025-02-03 PROCEDURE — 32674 THORACOSCOPY LYMPH NODE EXC: CPT | Performed by: STUDENT IN AN ORGANIZED HEALTH CARE EDUCATION/TRAINING PROGRAM

## 2025-02-03 PROCEDURE — 2060000001 HC INTERMEDIATE ICU ROOM DAILY

## 2025-02-03 PROCEDURE — 3600000009 HC OR TIME - EACH INCREMENTAL 1 MINUTE - PROCEDURE LEVEL FOUR: Performed by: STUDENT IN AN ORGANIZED HEALTH CARE EDUCATION/TRAINING PROGRAM

## 2025-02-03 PROCEDURE — 2500000005 HC RX 250 GENERAL PHARMACY W/O HCPCS: Performed by: ANESTHESIOLOGIST ASSISTANT

## 2025-02-03 PROCEDURE — 88307 TISSUE EXAM BY PATHOLOGIST: CPT | Mod: TC,AHULAB | Performed by: STUDENT IN AN ORGANIZED HEALTH CARE EDUCATION/TRAINING PROGRAM

## 2025-02-03 PROCEDURE — 88305 TISSUE EXAM BY PATHOLOGIST: CPT | Performed by: STUDENT IN AN ORGANIZED HEALTH CARE EDUCATION/TRAINING PROGRAM

## 2025-02-03 PROCEDURE — 2500000004 HC RX 250 GENERAL PHARMACY W/ HCPCS (ALT 636 FOR OP/ED): Performed by: ANESTHESIOLOGY

## 2025-02-03 PROCEDURE — 2500000004 HC RX 250 GENERAL PHARMACY W/ HCPCS (ALT 636 FOR OP/ED): Performed by: STUDENT IN AN ORGANIZED HEALTH CARE EDUCATION/TRAINING PROGRAM

## 2025-02-03 PROCEDURE — 2500000005 HC RX 250 GENERAL PHARMACY W/O HCPCS: Performed by: STUDENT IN AN ORGANIZED HEALTH CARE EDUCATION/TRAINING PROGRAM

## 2025-02-03 PROCEDURE — 0BBC4ZZ EXCISION OF RIGHT UPPER LUNG LOBE, PERCUTANEOUS ENDOSCOPIC APPROACH: ICD-10-PCS | Performed by: STUDENT IN AN ORGANIZED HEALTH CARE EDUCATION/TRAINING PROGRAM

## 2025-02-03 PROCEDURE — 36415 COLL VENOUS BLD VENIPUNCTURE: CPT | Performed by: STUDENT IN AN ORGANIZED HEALTH CARE EDUCATION/TRAINING PROGRAM

## 2025-02-03 PROCEDURE — 2500000005 HC RX 250 GENERAL PHARMACY W/O HCPCS: Performed by: NURSE PRACTITIONER

## 2025-02-03 PROCEDURE — 2720000007 HC OR 272 NO HCPCS: Performed by: STUDENT IN AN ORGANIZED HEALTH CARE EDUCATION/TRAINING PROGRAM

## 2025-02-03 PROCEDURE — 99233 SBSQ HOSP IP/OBS HIGH 50: CPT | Performed by: NURSE PRACTITIONER

## 2025-02-03 PROCEDURE — 88342 IMHCHEM/IMCYTCHM 1ST ANTB: CPT | Performed by: STUDENT IN AN ORGANIZED HEALTH CARE EDUCATION/TRAINING PROGRAM

## 2025-02-03 RX ORDER — ONDANSETRON HYDROCHLORIDE 2 MG/ML
INJECTION, SOLUTION INTRAVENOUS AS NEEDED
Status: DISCONTINUED | OUTPATIENT
Start: 2025-02-03 | End: 2025-02-03

## 2025-02-03 RX ORDER — ATORVASTATIN CALCIUM 10 MG/1
10 TABLET, FILM COATED ORAL NIGHTLY
Status: DISCONTINUED | OUTPATIENT
Start: 2025-02-03 | End: 2025-02-06 | Stop reason: HOSPADM

## 2025-02-03 RX ORDER — NALOXONE HYDROCHLORIDE 1 MG/ML
0.2 INJECTION INTRAMUSCULAR; INTRAVENOUS; SUBCUTANEOUS EVERY 5 MIN PRN
Status: DISCONTINUED | OUTPATIENT
Start: 2025-02-03 | End: 2025-02-06 | Stop reason: HOSPADM

## 2025-02-03 RX ORDER — MIDAZOLAM HYDROCHLORIDE 1 MG/ML
INJECTION INTRAMUSCULAR; INTRAVENOUS AS NEEDED
Status: DISCONTINUED | OUTPATIENT
Start: 2025-02-03 | End: 2025-02-03

## 2025-02-03 RX ORDER — SODIUM CHLORIDE 0.9 G/100ML
INJECTION, SOLUTION IRRIGATION AS NEEDED
Status: DISCONTINUED | OUTPATIENT
Start: 2025-02-03 | End: 2025-02-03 | Stop reason: HOSPADM

## 2025-02-03 RX ORDER — OXYCODONE HYDROCHLORIDE 5 MG/1
5 TABLET ORAL EVERY 6 HOURS PRN
Status: DISCONTINUED | OUTPATIENT
Start: 2025-02-03 | End: 2025-02-06 | Stop reason: HOSPADM

## 2025-02-03 RX ORDER — ONDANSETRON HYDROCHLORIDE 2 MG/ML
4 INJECTION, SOLUTION INTRAVENOUS EVERY 8 HOURS PRN
Status: DISCONTINUED | OUTPATIENT
Start: 2025-02-03 | End: 2025-02-06 | Stop reason: HOSPADM

## 2025-02-03 RX ORDER — CEFAZOLIN SODIUM 2 G/100ML
2 INJECTION, SOLUTION INTRAVENOUS EVERY 8 HOURS
Status: COMPLETED | OUTPATIENT
Start: 2025-02-03 | End: 2025-02-04

## 2025-02-03 RX ORDER — OXYCODONE HYDROCHLORIDE 5 MG/1
10 TABLET ORAL EVERY 6 HOURS PRN
Status: DISCONTINUED | OUTPATIENT
Start: 2025-02-03 | End: 2025-02-06 | Stop reason: HOSPADM

## 2025-02-03 RX ORDER — AMLODIPINE BESYLATE 5 MG/1
5 TABLET ORAL DAILY
Status: DISCONTINUED | OUTPATIENT
Start: 2025-02-04 | End: 2025-02-06 | Stop reason: HOSPADM

## 2025-02-03 RX ORDER — LIDOCAINE HYDROCHLORIDE 20 MG/ML
INJECTION, SOLUTION EPIDURAL; INFILTRATION; INTRACAUDAL; PERINEURAL AS NEEDED
Status: DISCONTINUED | OUTPATIENT
Start: 2025-02-03 | End: 2025-02-03

## 2025-02-03 RX ORDER — ROCURONIUM BROMIDE 10 MG/ML
INJECTION, SOLUTION INTRAVENOUS AS NEEDED
Status: DISCONTINUED | OUTPATIENT
Start: 2025-02-03 | End: 2025-02-03

## 2025-02-03 RX ORDER — SODIUM CHLORIDE, SODIUM LACTATE, POTASSIUM CHLORIDE, CALCIUM CHLORIDE 600; 310; 30; 20 MG/100ML; MG/100ML; MG/100ML; MG/100ML
INJECTION, SOLUTION INTRAVENOUS CONTINUOUS PRN
Status: DISCONTINUED | OUTPATIENT
Start: 2025-02-03 | End: 2025-02-03

## 2025-02-03 RX ORDER — ALBUMIN HUMAN 50 G/1000ML
25 SOLUTION INTRAVENOUS ONCE
Status: COMPLETED | OUTPATIENT
Start: 2025-02-03 | End: 2025-02-03

## 2025-02-03 RX ORDER — FENTANYL CITRATE 50 UG/ML
INJECTION, SOLUTION INTRAMUSCULAR; INTRAVENOUS AS NEEDED
Status: DISCONTINUED | OUTPATIENT
Start: 2025-02-03 | End: 2025-02-03

## 2025-02-03 RX ORDER — CEFAZOLIN SODIUM 2 G/100ML
2 INJECTION, SOLUTION INTRAVENOUS ONCE
Status: DISCONTINUED | OUTPATIENT
Start: 2025-02-03 | End: 2025-02-03 | Stop reason: HOSPADM

## 2025-02-03 RX ORDER — TRIAMTERENE/HYDROCHLOROTHIAZID 37.5-25 MG
1 TABLET ORAL DAILY
Status: DISCONTINUED | OUTPATIENT
Start: 2025-02-04 | End: 2025-02-06 | Stop reason: HOSPADM

## 2025-02-03 RX ORDER — LIDOCAINE HYDROCHLORIDE 10 MG/ML
0.1 INJECTION, SOLUTION EPIDURAL; INFILTRATION; INTRACAUDAL; PERINEURAL ONCE
Status: DISCONTINUED | OUTPATIENT
Start: 2025-02-03 | End: 2025-02-03 | Stop reason: HOSPADM

## 2025-02-03 RX ORDER — HYDROMORPHONE HYDROCHLORIDE 1 MG/ML
0.2 INJECTION, SOLUTION INTRAMUSCULAR; INTRAVENOUS; SUBCUTANEOUS EVERY 2 HOUR PRN
Status: DISCONTINUED | OUTPATIENT
Start: 2025-02-03 | End: 2025-02-03

## 2025-02-03 RX ORDER — OXYCODONE HYDROCHLORIDE 5 MG/1
5 TABLET ORAL EVERY 4 HOURS PRN
Status: DISCONTINUED | OUTPATIENT
Start: 2025-02-03 | End: 2025-02-03 | Stop reason: HOSPADM

## 2025-02-03 RX ORDER — MEPERIDINE HYDROCHLORIDE 25 MG/ML
12.5 INJECTION INTRAMUSCULAR; INTRAVENOUS; SUBCUTANEOUS EVERY 10 MIN PRN
Status: DISCONTINUED | OUTPATIENT
Start: 2025-02-03 | End: 2025-02-03 | Stop reason: HOSPADM

## 2025-02-03 RX ORDER — HEPARIN SODIUM 5000 [USP'U]/ML
5000 INJECTION, SOLUTION INTRAVENOUS; SUBCUTANEOUS ONCE
Status: COMPLETED | OUTPATIENT
Start: 2025-02-03 | End: 2025-02-03

## 2025-02-03 RX ORDER — HYDROMORPHONE HYDROCHLORIDE 1 MG/ML
INJECTION, SOLUTION INTRAMUSCULAR; INTRAVENOUS; SUBCUTANEOUS AS NEEDED
Status: DISCONTINUED | OUTPATIENT
Start: 2025-02-03 | End: 2025-02-03

## 2025-02-03 RX ORDER — METHOCARBAMOL 500 MG/1
500 TABLET, FILM COATED ORAL EVERY 8 HOURS SCHEDULED
Status: DISCONTINUED | OUTPATIENT
Start: 2025-02-03 | End: 2025-02-06 | Stop reason: HOSPADM

## 2025-02-03 RX ORDER — AMOXICILLIN 250 MG
2 CAPSULE ORAL 2 TIMES DAILY
Status: DISCONTINUED | OUTPATIENT
Start: 2025-02-03 | End: 2025-02-06 | Stop reason: HOSPADM

## 2025-02-03 RX ORDER — PROPOFOL 10 MG/ML
INJECTION, EMULSION INTRAVENOUS AS NEEDED
Status: DISCONTINUED | OUTPATIENT
Start: 2025-02-03 | End: 2025-02-03

## 2025-02-03 RX ORDER — LIDOCAINE 560 MG/1
1 PATCH PERCUTANEOUS; TOPICAL; TRANSDERMAL DAILY
Status: DISCONTINUED | OUTPATIENT
Start: 2025-02-03 | End: 2025-02-06 | Stop reason: HOSPADM

## 2025-02-03 RX ORDER — KETOROLAC TROMETHAMINE 30 MG/ML
15 INJECTION, SOLUTION INTRAMUSCULAR; INTRAVENOUS EVERY 6 HOURS
Status: COMPLETED | OUTPATIENT
Start: 2025-02-03 | End: 2025-02-04

## 2025-02-03 RX ORDER — ACETAMINOPHEN 325 MG/1
650 TABLET ORAL EVERY 6 HOURS
Status: DISCONTINUED | OUTPATIENT
Start: 2025-02-03 | End: 2025-02-06 | Stop reason: HOSPADM

## 2025-02-03 RX ORDER — BUPIVACAINE HCL/EPINEPHRINE 0.25-.0005
VIAL (ML) INJECTION AS NEEDED
Status: DISCONTINUED | OUTPATIENT
Start: 2025-02-03 | End: 2025-02-03 | Stop reason: HOSPADM

## 2025-02-03 RX ORDER — CEFAZOLIN 1 G/1
INJECTION, POWDER, FOR SOLUTION INTRAVENOUS AS NEEDED
Status: DISCONTINUED | OUTPATIENT
Start: 2025-02-03 | End: 2025-02-03

## 2025-02-03 RX ORDER — ONDANSETRON 4 MG/1
4 TABLET, ORALLY DISINTEGRATING ORAL EVERY 8 HOURS PRN
Status: DISCONTINUED | OUTPATIENT
Start: 2025-02-03 | End: 2025-02-06 | Stop reason: HOSPADM

## 2025-02-03 RX ORDER — LABETALOL HYDROCHLORIDE 5 MG/ML
INJECTION, SOLUTION INTRAVENOUS AS NEEDED
Status: DISCONTINUED | OUTPATIENT
Start: 2025-02-03 | End: 2025-02-03

## 2025-02-03 RX ORDER — ONDANSETRON HYDROCHLORIDE 2 MG/ML
4 INJECTION, SOLUTION INTRAVENOUS ONCE AS NEEDED
Status: COMPLETED | OUTPATIENT
Start: 2025-02-03 | End: 2025-02-03

## 2025-02-03 RX ORDER — POLYETHYLENE GLYCOL 3350 17 G/17G
17 POWDER, FOR SOLUTION ORAL DAILY
Status: DISCONTINUED | OUTPATIENT
Start: 2025-02-03 | End: 2025-02-06 | Stop reason: HOSPADM

## 2025-02-03 RX ORDER — ENOXAPARIN SODIUM 100 MG/ML
40 INJECTION SUBCUTANEOUS EVERY 24 HOURS
Status: DISCONTINUED | OUTPATIENT
Start: 2025-02-03 | End: 2025-02-06 | Stop reason: HOSPADM

## 2025-02-03 RX ORDER — ESCITALOPRAM OXALATE 20 MG/1
40 TABLET ORAL DAILY
Status: DISCONTINUED | OUTPATIENT
Start: 2025-02-04 | End: 2025-02-06 | Stop reason: HOSPADM

## 2025-02-03 RX ADMIN — PROPOFOL 20 MG: 10 INJECTION, EMULSION INTRAVENOUS at 11:29

## 2025-02-03 RX ADMIN — CEFAZOLIN SODIUM 2 G: 2 INJECTION, SOLUTION INTRAVENOUS at 18:26

## 2025-02-03 RX ADMIN — LIDOCAINE HYDROCHLORIDE 50 MG: 20 INJECTION, SOLUTION EPIDURAL; INFILTRATION; INTRACAUDAL; PERINEURAL at 10:03

## 2025-02-03 RX ADMIN — PROPOFOL 20 MG: 10 INJECTION, EMULSION INTRAVENOUS at 11:28

## 2025-02-03 RX ADMIN — HYDROMORPHONE HYDROCHLORIDE 0.5 MG: 1 INJECTION, SOLUTION INTRAMUSCULAR; INTRAVENOUS; SUBCUTANEOUS at 12:29

## 2025-02-03 RX ADMIN — ROCURONIUM BROMIDE 50 MG: 10 INJECTION, SOLUTION INTRAVENOUS at 10:03

## 2025-02-03 RX ADMIN — ONDANSETRON 4 MG: 2 INJECTION, SOLUTION INTRAMUSCULAR; INTRAVENOUS at 11:21

## 2025-02-03 RX ADMIN — HYDROMORPHONE HYDROCHLORIDE 0.75 MG: 1 INJECTION, SOLUTION INTRAMUSCULAR; INTRAVENOUS; SUBCUTANEOUS at 11:28

## 2025-02-03 RX ADMIN — PROPOFOL 100 MG: 10 INJECTION, EMULSION INTRAVENOUS at 10:03

## 2025-02-03 RX ADMIN — OXYCODONE HYDROCHLORIDE 5 MG: 5 TABLET ORAL at 22:28

## 2025-02-03 RX ADMIN — PROPOFOL 20 MG: 10 INJECTION, EMULSION INTRAVENOUS at 11:27

## 2025-02-03 RX ADMIN — KETOROLAC TROMETHAMINE 15 MG: 30 INJECTION, SOLUTION INTRAMUSCULAR at 15:41

## 2025-02-03 RX ADMIN — SODIUM CHLORIDE, POTASSIUM CHLORIDE, SODIUM LACTATE AND CALCIUM CHLORIDE: 600; 310; 30; 20 INJECTION, SOLUTION INTRAVENOUS at 08:28

## 2025-02-03 RX ADMIN — LABETALOL HYDROCHLORIDE 10 MG: 5 INJECTION INTRAVENOUS at 10:07

## 2025-02-03 RX ADMIN — HYDROMORPHONE HYDROCHLORIDE 0.25 MG: 1 INJECTION, SOLUTION INTRAMUSCULAR; INTRAVENOUS; SUBCUTANEOUS at 10:58

## 2025-02-03 RX ADMIN — FENTANYL CITRATE 100 MCG: 50 INJECTION, SOLUTION INTRAMUSCULAR; INTRAVENOUS at 10:03

## 2025-02-03 RX ADMIN — ROCURONIUM BROMIDE 20 MG: 10 INJECTION, SOLUTION INTRAVENOUS at 10:56

## 2025-02-03 RX ADMIN — KETOROLAC TROMETHAMINE 15 MG: 30 INJECTION, SOLUTION INTRAMUSCULAR at 18:26

## 2025-02-03 RX ADMIN — ONDANSETRON 4 MG: 2 INJECTION, SOLUTION INTRAMUSCULAR; INTRAVENOUS at 12:29

## 2025-02-03 RX ADMIN — ATORVASTATIN CALCIUM 10 MG: 10 TABLET, FILM COATED ORAL at 22:31

## 2025-02-03 RX ADMIN — OXYCODONE HYDROCHLORIDE 10 MG: 5 TABLET ORAL at 15:42

## 2025-02-03 RX ADMIN — ENOXAPARIN SODIUM 40 MG: 40 INJECTION SUBCUTANEOUS at 22:25

## 2025-02-03 RX ADMIN — SUGAMMADEX 200 MG: 100 INJECTION, SOLUTION INTRAVENOUS at 11:27

## 2025-02-03 RX ADMIN — DEXAMETHASONE SODIUM PHOSPHATE 4 MG: 4 INJECTION, SOLUTION INTRAMUSCULAR; INTRAVENOUS at 10:03

## 2025-02-03 RX ADMIN — METHOCARBAMOL TABLETS 500 MG: 500 TABLET, COATED ORAL at 22:23

## 2025-02-03 RX ADMIN — METHOCARBAMOL TABLETS 500 MG: 500 TABLET, COATED ORAL at 15:46

## 2025-02-03 RX ADMIN — PROPOFOL 20 MG: 10 INJECTION, EMULSION INTRAVENOUS at 11:25

## 2025-02-03 RX ADMIN — PROPOFOL 20 MG: 10 INJECTION, EMULSION INTRAVENOUS at 11:23

## 2025-02-03 RX ADMIN — ROCURONIUM BROMIDE 20 MG: 10 INJECTION, SOLUTION INTRAVENOUS at 10:23

## 2025-02-03 RX ADMIN — LIDOCAINE 4% 1 PATCH: 40 PATCH TOPICAL at 15:40

## 2025-02-03 RX ADMIN — CEFAZOLIN 2 G: 1 INJECTION, POWDER, FOR SOLUTION INTRAMUSCULAR; INTRAVENOUS at 10:09

## 2025-02-03 RX ADMIN — HEPARIN SODIUM 5000 UNITS: 5000 INJECTION, SOLUTION INTRAVENOUS; SUBCUTANEOUS at 09:03

## 2025-02-03 RX ADMIN — SENNOSIDES AND DOCUSATE SODIUM 2 TABLET: 50; 8.6 TABLET ORAL at 22:23

## 2025-02-03 RX ADMIN — ALBUMIN HUMAN 25 G: 0.05 INJECTION, SOLUTION INTRAVENOUS at 13:42

## 2025-02-03 RX ADMIN — MIDAZOLAM HYDROCHLORIDE 2 MG: 1 INJECTION, SOLUTION INTRAMUSCULAR; INTRAVENOUS at 09:57

## 2025-02-03 RX ADMIN — ACETAMINOPHEN 650 MG: 325 TABLET, FILM COATED ORAL at 18:25

## 2025-02-03 RX ADMIN — CARBOXYMETHYLCELLULOSE SODIUM 2 DROP: 5 SOLUTION/ DROPS OPHTHALMIC at 10:04

## 2025-02-03 ASSESSMENT — PAIN - FUNCTIONAL ASSESSMENT
PAIN_FUNCTIONAL_ASSESSMENT: 0-10
PAIN_FUNCTIONAL_ASSESSMENT: UNABLE TO SELF-REPORT
PAIN_FUNCTIONAL_ASSESSMENT: 0-10
PAIN_FUNCTIONAL_ASSESSMENT: 0-10
PAIN_FUNCTIONAL_ASSESSMENT: UNABLE TO SELF-REPORT
PAIN_FUNCTIONAL_ASSESSMENT: 0-10

## 2025-02-03 ASSESSMENT — COGNITIVE AND FUNCTIONAL STATUS - GENERAL
HELP NEEDED FOR BATHING: A LITTLE
MOVING FROM LYING ON BACK TO SITTING ON SIDE OF FLAT BED WITH BEDRAILS: A LITTLE
TOILETING: A LITTLE
MOVING TO AND FROM BED TO CHAIR: A LITTLE
STANDING UP FROM CHAIR USING ARMS: A LITTLE
DAILY ACTIVITIY SCORE: 21
TURNING FROM BACK TO SIDE WHILE IN FLAT BAD: A LITTLE
DRESSING REGULAR LOWER BODY CLOTHING: A LITTLE
MOBILITY SCORE: 20

## 2025-02-03 ASSESSMENT — PAIN SCALES - GENERAL
PAINLEVEL_OUTOF10: 5 - MODERATE PAIN
PAINLEVEL_OUTOF10: 7
PAINLEVEL_OUTOF10: 3
PAINLEVEL_OUTOF10: 5 - MODERATE PAIN
PAINLEVEL_OUTOF10: 7
PAINLEVEL_OUTOF10: 7
PAINLEVEL_OUTOF10: 0 - NO PAIN
PAIN_LEVEL: 0
PAINLEVEL_OUTOF10: 4
PAINLEVEL_OUTOF10: 0 - NO PAIN

## 2025-02-03 ASSESSMENT — PAIN SCALES - PAIN ASSESSMENT IN ADVANCED DEMENTIA (PAINAD): TOTALSCORE: MEDICATION (SEE MAR)

## 2025-02-03 ASSESSMENT — COLUMBIA-SUICIDE SEVERITY RATING SCALE - C-SSRS
1. IN THE PAST MONTH, HAVE YOU WISHED YOU WERE DEAD OR WISHED YOU COULD GO TO SLEEP AND NOT WAKE UP?: NO
2. HAVE YOU ACTUALLY HAD ANY THOUGHTS OF KILLING YOURSELF?: NO
6. HAVE YOU EVER DONE ANYTHING, STARTED TO DO ANYTHING, OR PREPARED TO DO ANYTHING TO END YOUR LIFE?: NO

## 2025-02-03 ASSESSMENT — PAIN DESCRIPTION - LOCATION
LOCATION: RIB CAGE
LOCATION: RIB CAGE
LOCATION: CHEST

## 2025-02-03 ASSESSMENT — PAIN DESCRIPTION - ORIENTATION
ORIENTATION: RIGHT

## 2025-02-03 NOTE — DISCHARGE INSTRUCTIONS
On day of follow up appointment with Dr. Landin -  obtain Chest Xray prior to your appointment in Radiology at [facility]

## 2025-02-03 NOTE — OP NOTE
Right Thoracoscopy; Lung Tissue Excision; Upper Lobe Wedge (R), Right Chest Lymph Node Removal (R) Operative Note     Date: 2/3/2025  OR Location: [facility]    Name: Gifty Shaw, : 1960, Age: 64 y.o., MRN: [id number], Sex: female    Diagnosis  Pre-op Diagnosis      * Lung nodules [R91.8] Post-op Diagnosis     * Lung nodules [R91.8]     Procedures  Right Thoracoscopy upper lobe wedge resection, mediastinal lymphadenectomy    Surgeons      * Marlena Landin - Primary    Resident/Fellow/Other Assistant:  Surgeons and Role:  * No surgeons found with a matching role *    Staff:   Circulator: Tonia  Circulator: Nakia Christina Person: Tomi Christina Person: Katie Johnson Circulator: Sabrina    Anesthesia Staff: Anesthesiologist: Raud Joiner MD  C-AA: GENARO Andrade    Procedure Summary  Anesthesia: General  ASA: III  Estimated Blood Loss: 75mL  Intra-op Medications:   Administrations occurring from 1000 to 1230 on 25:   Medication Name Total Dose   BUPivacaine-EPINEPHrine (Marcaine w/EPI) 0.25 %-1:200,000 injection 30 mL   sodium chloride 0.9 % irrigation solution 1,000 mL   ceFAZolin (Ancef) vial 1 g 2 g   dexAMETHasone (Decadron) injection 4 mg/mL 4 mg   fentaNYL (Sublimaze) injection 50 mcg/mL 100 mcg   HYDROmorphone (Dilaudid) injection 1 mg/mL 1 mg   labetalol (Normodyne,Trandate) injection 10 mg   LR infusion Cannot be calculated   lidocaine PF (Xylocaine-MPF) local injection 2 % 50 mg   lubricating eye drops ophthalmic solution 2 drop   ondansetron (Zofran) 2 mg/mL injection 4 mg   propofol (Diprivan) injection 10 mg/mL 200 mg   rocuronium (ZeMuron) 50 mg/5 mL injection 90 mg   sugammadex (Bridion) 200 mg/2 mL injection 200 mg              Anesthesia Record               Intraprocedure I/O Totals          Intake    LR infusion 900.00 mL    Total Intake 900 mL       Output    Est. Blood Loss 10 mL    Total Output 10 mL       Net    Net Volume 890 mL          Specimen:   ID Type Source  Tests Collected by Time   1 : Right Upper Lobe Wedge Resection Tissue LUNG WEDGE RESECTION RIGHT (SPECIFY SITE) SURGICAL PATHOLOGY EXAM Tonia Wu RN 2/3/2025 1042   2 : Level 7 Lymph node Tissue LYMPH NODE PULMONARY (SPECIFY SITE) SURGICAL PATHOLOGY EXAM Tonia Wu RN 2/3/2025 1033   3 : Level 10 Lymph node Tissue LYMPH NODE PULMONARY (SPECIFY SITE) SURGICAL PATHOLOGY EXAM Tonia Wu RN 2/3/2025 1052   4 : Level 11 Lymph node Tissue LYMPH NODE PULMONARY (SPECIFY SITE) SURGICAL PATHOLOGY EXAM Tonia Wu RN 2/3/2025 1055   5 : Level 4R Lymph node Tissue LYMPH NODE PULMONARY (SPECIFY SITE) SURGICAL PATHOLOGY EXAM Tonia Wu RN 2/3/2025 1109                 Drains and/or Catheters:   Chest Tube Right 28 Fr (Active)   Chest Tube Air Leak No 02/03/25 1139   Dressing Status Clean;Dry;Occlusive 02/03/25 1139   Site Assessment Clean;Dry;Intact 02/03/25 1139   Surrounding Skin Intact 02/03/25 1139       Tourniquet Times:         Implants:     Findings: preliminary path likely adenoCA    Indications: Gifty Shaw is an 64 y.o. female who is having surgery for Lung nodules [R91.8]. This is primarily GGO with enlarging solid component in the right apex concerning for malignancy. We discussed surgical resection as her PFTs were adequate and patient was agreeable. Surgery was performed with curative intent.    The patient was seen in the preoperative area. The risks, benefits, complications, treatment options, non-operative alternatives, expected recovery and outcomes were discussed with the patient. The possibilities of reaction to medication, pulmonary aspiration, injury to surrounding structures, bleeding, recurrent infection, the need for additional procedures, failure to diagnose a condition, and creating a complication requiring transfusion or operation were discussed with the patient. The patient concurred with the proposed plan, giving informed consent.  The site of surgery was properly noted/marked if  necessary per policy. The patient has been actively warmed in preoperative area. Preoperative antibiotics have been ordered and given within 1 hours of incision. Venous thrombosis prophylaxis have been ordered including bilateral sequential compression devices and chemical prophylaxis    Procedure Details: Patient was brought into the operating suite procedural information was confirmed.  General anesthesia was induced and a double-lumen endotracheal tube was placed.  She was transition to the left lateral decubitus position and appropriately padded.  Her right chest was prepped and draped in the typical sterile fashion.  We began with a 12 mm incision in the 8th intercostal space along the posterior axillary line. Camera was inserted and no disseminated disease was seen. We then placed an additional port several cm below the scapular tip. Lastly, our 2cm working port was placed in the 4th intercostal space and an Juan wound protector was placed.  The lesion was palpated in the lateral aspect of the right upper lobe near apex.  A wide wedge resection was taken with serial black endoscopic staple loads.  Margins were grossly negative by at least 0.5cm.  Luminary pathology confirmed and NSCLC likely adenocarcinoma.  We released the infrapulmonary open then and carried our posterior pleural dissection cephalad and family removing level 7 lymph node followed by level 10 lymph node along the airway.  Level 11 lymph node was removed between the upper lobe takeoff and left bronchus intermedius.  Lastly we sampled a level 4R lymph node.  Small bleeder in this area was clipped with a 5 mm clip applier.  All lymph nodes were sent for permanent pathology. Hemostasis was confirmed, warm water was irrigated throughout the chest. Intercostal nerve blocks performed and a 28 Fr chest tube was placed into the apex and the remaining lung expanded well under visualization. Camera was removed, chest tube secured, and the incisions  were closed in layers. Patient was placed supine, extubated, and taken to PACU in stable condition.     Complications:  None; patient tolerated the procedure well.    Disposition: PACU - hemodynamically stable.  Condition: stable                 Additional Details: to PACU then step down   CT to -20mmHg, water seal at midnight  OOB/ambulate/IS  ADAT  Ok for SQH  Multimodal pain control  CXR in PACU and AM      Attending Attestation: I was present for the entire procedure.    Marlena Landin  Phone Number: [phone]

## 2025-02-03 NOTE — ANESTHESIA PROCEDURE NOTES
Airway  Date/Time: 2/3/2025 10:06 AM  Urgency: elective    Airway not difficult    Staffing  Performed: ADILSON   Authorized by: Radu Joiner MD    Performed by: GENARO Andrade  Patient location during procedure: OR    Indications and Patient Condition  Indications for airway management: anesthesia  Spontaneous Ventilation: absent  Sedation level: deep  Preoxygenated: yes  Patient position: sniffing  MILS maintained throughout  Mask difficulty assessment: 1 - vent by mask  Planned trial extubation    Final Airway Details  Final airway type: endotracheal airway      Successful airway: ETT - double lumen left  Cuffed: yes   Successful intubation technique: direct laryngoscopy  Facilitating devices/methods: intubating stylet  Endotracheal tube insertion site: oral  Blade: Kenyetta  Blade size: #3  ETT DL size (fr): 35  Cormack-Lehane Classification: grade I - full view of glottis  Placement verified by: chest auscultation, bronchoscopy and capnometry   Measured from: lips  Number of attempts at approach: 1  Ventilation between attempts: none  Number of other approaches attempted: 0

## 2025-02-03 NOTE — PROGRESS NOTES
"Gifty Shaw is a 64 y.o. female on day 0 of admission presenting with Lung nodules 2/3 s/p Right Thoracoscopy upper lobe wedge resection, mediastinal lymphadenectomy with Dr. Landin.    Subjective   Alert in PACU;  per nursing recent episode of hypoxia required BVM,  current SpO2 93%    Objective     Physical Exam  Constitutional:       Appearance: Normal appearance.   Eyes:      Conjunctiva/sclera: Conjunctivae normal.   Cardiovascular:      Rate and Rhythm: Normal rate and regular rhythm.      Heart sounds: Normal heart sounds.   Pulmonary:      Effort: Pulmonary effort is normal.      Breath sounds: Rhonchi present.      Comments: Right pleural CT -20cm;  air leak intermittent 1 chamber with cough;  minimal bloody drainage  Right chest dsgs CDI    Abdominal:      General: Abdomen is flat.      Palpations: Abdomen is soft.   Musculoskeletal:         General: Normal range of motion.   Skin:     General: Skin is warm and dry.   Neurological:      General: No focal deficit present.      Mental Status: She is alert.             Last Recorded Vitals   Blood pressure 94/58, pulse 70, temperature 36.3 °C (97.3 °F), temperature source Temporal, resp. rate 18, height 1.549 m (5' 1\"), weight 45.1 kg (99 lb 6.8 oz), last menstrual period 01/01/2004, SpO2 95%.    Intake/Output Last 3 Shifts  No intake/output data recorded.    Lines  Chest Tube Right 28 Fr (Active)   Placement Date/Time: 02/03/25 1114   Placed by: Mushtaq  Chest Tube Orientation: Right  Chest Tube Drain Tube Size (Fr): 28 Fr  Chest Tube Drainage System: Dry seal chest drain   Number of days: 0        Recent Labs  CMP:      No lab exists for component: \"CA\"  CBC:    COAG:     HEME/ENDO:     CARDIAC:        Imaging  XR chest 1 view   Final Result   Immediately status post right upper lobe wedge resection and right   mediastinal lymph node resection.        Right-sided chest tube. No pneumothorax. Mild mid to lower lateral   right chest wall " subcutaneous emphysema.        Postsurgical fullness in the upper right side of the mediastinum with   surgical clips in place.        Stable findings of prior granulomatous disease on the right as   described.        MACRO:   None        Signed by: Don Rojo 2/3/2025 12:28 PM   Dictation workstation:   [id number]           Medications  Scheduled medications  acetaminophen, 650 mg, oral, q6h  [START ON 2/4/2025] amLODIPine, 5 mg, oral, Daily  atorvastatin, 10 mg, oral, Nightly  ceFAZolin, 2 g, intravenous, Once  ceFAZolin, 2 g, intravenous, q8h  enoxaparin, 40 mg, subcutaneous, q24h  [START ON 2/4/2025] escitalopram, 40 mg, oral, Daily  ketorolac, 15 mg, intravenous, q6h  lidocaine, 1 patch, transdermal, Daily  lidocaine, 0.1 mL, subcutaneous, Once  methocarbamol, 500 mg, oral, q8h BAILEY  oxygen, , inhalation, Continuous - 02/gases  oxygen, , inhalation, Continuous - Inhalation  polyethylene glycol, 17 g, oral, Daily  sennosides-docusate sodium, 2 tablet, oral, BID  [START ON 2/4/2025] triamterene-hydrochlorothiazid, 1 tablet, oral, Daily      Continuous medications     PRN medications  PRN medications: HYDROmorphone, HYDROmorphone, meperidine, naloxone, ondansetron ODT **OR** ondansetron, oxyCODONE, oxyCODONE, oxyCODONE, phenoL, promethazine    Assessment/Plan   Assessment & Plan  Lung nodules    HTN (hypertension)    Eczema      Gifty Shaw is a 64 y.o. female on day 0 of admission presenting with Lung nodules 2/3 s/p Right Thoracoscopy upper lobe wedge resection, mediastinal lymphadenectomy with Dr. Landin.   Preliminary pathology likely adenocarcinoma.  PMH notable for current 25ppy smoker & HTN.      Neuro: expected Post op pain  - PRN oxy & Dilaudid for breakthrough  - Scheduled Tylenol, robaxin, toradol & Lidoderm patch  - home lexapro; hold trazadone    Cardiac: HTN  - Continuous Cardiac Monitor  - home norvasc, atorvastatin, maxzide    Respiratory;  hypoxic episode after coughing requiring BVM  in PACU  - Maintain Chest Tube to -20cm;  H2O seal at MN  - Daily Chest X-ray while chest tube in place  - OOB to chair 2 hours after surgery; ambulate in bhakta evening of surgery  - IS and acapella Q1H while awake    GI:  - Clear liquid; regular at dinner  - Bowel Regimen    : no active issues    Heme:  - DVT PPx- SCD; Lovenox this evening     ID   - Post op Ancef x 24 hours    Dispo: SDU; anticipate home in 1-2 days      Time spent on the assessment of patient, gathering and interpreting data, review of medical record/patient history, personally reviewing radiographic imaging and formulation of this note. With greater than 50% spent in personal discussion with patient/ family.  Time:65 minutes      Rocio GLASS Cap, APRN-CNP

## 2025-02-03 NOTE — ANESTHESIA PREPROCEDURE EVALUATION
Patient: Gifty Shaw    Procedure Information       Date/Time: 02/03/25 1000    Procedures:       Right Thoracoscopy; Lung Tissue Excision (Right: Chest)      Right Chest Lymph Node Removal (Right: Chest)    Location: [facility] OR 01 / Virtual [facility] OR    Surgeons: Marlena Landin, DO            Relevant Problems   Anesthesia (within normal limits)      Cardiac   (+) HTN (hypertension)   (+) Hypercholesterolemia      Pulmonary  Smoker 25py  R lung apical lesion   (+) COPD, mild (Multi)   (+) Lung nodules      Neuro  insomnia   (+) Anxiety disorder, transient organic   (+) Depression      GI (within normal limits)      /Renal  Hypokalemia 3.1      Liver (within normal limits)      Endocrine (within normal limits)      Hematology (within normal limits)      Skin   (+) Eczema       Clinical information reviewed:                   NPO Detail:  No data recorded     Physical Exam    Airway  Mallampati: II     Cardiovascular    Dental    Pulmonary    Abdominal            Anesthesia Plan    History of general anesthesia?: yes  History of complications of general anesthesia?: no    ASA 3     general     intravenous induction   Anesthetic plan and risks discussed with patient.

## 2025-02-03 NOTE — ANESTHESIA POSTPROCEDURE EVALUATION
Patient: Gifty Shaw    Procedure Summary       Date: 02/03/25 Room / Location: [facility] OR 01 / [facility] OR    Anesthesia Start: 0956 Anesthesia Stop: 1142    Procedures:       Right Thoracoscopy; Lung Tissue Excision; Upper Lobe Wedge (Right: Chest)      Right Chest Lymph Node Removal (Right: Chest) Diagnosis:       Lung nodules      (Lung nodules [R91.8])    Surgeons: Marlena Landin DO Responsible Provider: Radu Joiner MD    Anesthesia Type: general ASA Status: 3            Anesthesia Type: general    Vitals Value Taken Time   /66 02/03/25 1231   Temp 36.3 °C (97.3 °F) 02/03/25 1230   Pulse 78 02/03/25 1246   Resp 18 02/03/25 1230   SpO2 97 % 02/03/25 1246   Vitals shown include unfiled device data.    Anesthesia Post Evaluation    Patient location during evaluation: bedside  Patient participation: complete - patient cannot participate  Level of consciousness: awake  Pain score: 0  Pain management: adequate  Airway patency: patent  Cardiovascular status: acceptable and hemodynamically stable  Respiratory status: acceptable and nonlabored ventilation  Hydration status: acceptable  Postoperative Nausea and Vomiting: none        No notable events documented.  Pt desaturated twice in PACU while unable to cough up mucus.  Then SaO2 improved.  Encouraging incentive spirometry, deep breathing and coughing, pulmonary toilet administered.  AJ Joiner MD

## 2025-02-04 ENCOUNTER — APPOINTMENT (OUTPATIENT)
Dept: RADIOLOGY | Facility: HOSPITAL | Age: 65
End: 2025-02-04
Payer: COMMERCIAL

## 2025-02-04 LAB
ANION GAP SERPL CALC-SCNC: 10 MMOL/L (ref 10–20)
BUN SERPL-MCNC: 10 MG/DL (ref 6–23)
CALCIUM SERPL-MCNC: 8.7 MG/DL (ref 8.6–10.3)
CHLORIDE SERPL-SCNC: 101 MMOL/L (ref 98–107)
CO2 SERPL-SCNC: 29 MMOL/L (ref 21–32)
CREAT SERPL-MCNC: 0.69 MG/DL (ref 0.5–1.05)
EGFRCR SERPLBLD CKD-EPI 2021: >90 ML/MIN/1.73M*2
ERYTHROCYTE [DISTWIDTH] IN BLOOD BY AUTOMATED COUNT: 12.5 % (ref 11.5–14.5)
GLUCOSE SERPL-MCNC: 98 MG/DL (ref 74–99)
HCT VFR BLD AUTO: 35.1 % (ref 36–46)
HGB BLD-MCNC: 11.3 G/DL (ref 12–16)
MAGNESIUM SERPL-MCNC: 1.76 MG/DL (ref 1.6–2.4)
MCH RBC QN AUTO: 32.4 PG (ref 26–34)
MCHC RBC AUTO-ENTMCNC: 32.2 G/DL (ref 32–36)
MCV RBC AUTO: 101 FL (ref 80–100)
NRBC BLD-RTO: 0 /100 WBCS (ref 0–0)
PLATELET # BLD AUTO: 242 X10*3/UL (ref 150–450)
POTASSIUM SERPL-SCNC: 3.2 MMOL/L (ref 3.5–5.3)
RBC # BLD AUTO: 3.49 X10*6/UL (ref 4–5.2)
SODIUM SERPL-SCNC: 137 MMOL/L (ref 136–145)
WBC # BLD AUTO: 14.6 X10*3/UL (ref 4.4–11.3)

## 2025-02-04 PROCEDURE — 83735 ASSAY OF MAGNESIUM: CPT | Performed by: NURSE PRACTITIONER

## 2025-02-04 PROCEDURE — 2500000004 HC RX 250 GENERAL PHARMACY W/ HCPCS (ALT 636 FOR OP/ED): Performed by: NURSE PRACTITIONER

## 2025-02-04 PROCEDURE — 2500000002 HC RX 250 W HCPCS SELF ADMINISTERED DRUGS (ALT 637 FOR MEDICARE OP, ALT 636 FOR OP/ED): Performed by: NURSE PRACTITIONER

## 2025-02-04 PROCEDURE — 82374 ASSAY BLOOD CARBON DIOXIDE: CPT | Performed by: NURSE PRACTITIONER

## 2025-02-04 PROCEDURE — 2500000005 HC RX 250 GENERAL PHARMACY W/O HCPCS: Performed by: NURSE PRACTITIONER

## 2025-02-04 PROCEDURE — 71045 X-RAY EXAM CHEST 1 VIEW: CPT | Performed by: RADIOLOGY

## 2025-02-04 PROCEDURE — 2500000001 HC RX 250 WO HCPCS SELF ADMINISTERED DRUGS (ALT 637 FOR MEDICARE OP): Performed by: NURSE PRACTITIONER

## 2025-02-04 PROCEDURE — 2060000001 HC INTERMEDIATE ICU ROOM DAILY

## 2025-02-04 PROCEDURE — 85027 COMPLETE CBC AUTOMATED: CPT | Performed by: NURSE PRACTITIONER

## 2025-02-04 PROCEDURE — 71045 X-RAY EXAM CHEST 1 VIEW: CPT

## 2025-02-04 PROCEDURE — 36415 COLL VENOUS BLD VENIPUNCTURE: CPT | Performed by: NURSE PRACTITIONER

## 2025-02-04 PROCEDURE — 99232 SBSQ HOSP IP/OBS MODERATE 35: CPT | Performed by: NURSE PRACTITIONER

## 2025-02-04 RX ORDER — MAGNESIUM SULFATE HEPTAHYDRATE 40 MG/ML
2 INJECTION, SOLUTION INTRAVENOUS ONCE
Status: COMPLETED | OUTPATIENT
Start: 2025-02-04 | End: 2025-02-04

## 2025-02-04 RX ORDER — TRAZODONE HYDROCHLORIDE 50 MG/1
200 TABLET ORAL NIGHTLY
Status: DISCONTINUED | OUTPATIENT
Start: 2025-02-04 | End: 2025-02-06 | Stop reason: HOSPADM

## 2025-02-04 RX ORDER — POTASSIUM CHLORIDE 20 MEQ/1
40 TABLET, EXTENDED RELEASE ORAL ONCE
Status: COMPLETED | OUTPATIENT
Start: 2025-02-04 | End: 2025-02-04

## 2025-02-04 RX ADMIN — KETOROLAC TROMETHAMINE 15 MG: 30 INJECTION, SOLUTION INTRAMUSCULAR at 11:59

## 2025-02-04 RX ADMIN — OXYCODONE HYDROCHLORIDE 10 MG: 5 TABLET ORAL at 22:15

## 2025-02-04 RX ADMIN — ACETAMINOPHEN 650 MG: 325 TABLET, FILM COATED ORAL at 18:47

## 2025-02-04 RX ADMIN — METHOCARBAMOL TABLETS 500 MG: 500 TABLET, COATED ORAL at 14:11

## 2025-02-04 RX ADMIN — POTASSIUM CHLORIDE 40 MEQ: 1500 TABLET, EXTENDED RELEASE ORAL at 08:48

## 2025-02-04 RX ADMIN — LIDOCAINE 4% 1 PATCH: 40 PATCH TOPICAL at 08:47

## 2025-02-04 RX ADMIN — TRIAMTERENE AND HYDROCHLOROTHIAZIDE 1 TABLET: 37.5; 25 TABLET ORAL at 08:48

## 2025-02-04 RX ADMIN — OXYCODONE HYDROCHLORIDE 5 MG: 5 TABLET ORAL at 09:40

## 2025-02-04 RX ADMIN — AMLODIPINE BESYLATE 5 MG: 5 TABLET ORAL at 08:48

## 2025-02-04 RX ADMIN — TRAZODONE HYDROCHLORIDE 200 MG: 50 TABLET ORAL at 22:15

## 2025-02-04 RX ADMIN — KETOROLAC TROMETHAMINE 15 MG: 30 INJECTION, SOLUTION INTRAMUSCULAR at 00:32

## 2025-02-04 RX ADMIN — KETOROLAC TROMETHAMINE 15 MG: 30 INJECTION, SOLUTION INTRAMUSCULAR at 07:09

## 2025-02-04 RX ADMIN — OXYCODONE HYDROCHLORIDE 5 MG: 5 TABLET ORAL at 16:48

## 2025-02-04 RX ADMIN — POLYETHYLENE GLYCOL 3350 17 G: 17 POWDER, FOR SOLUTION ORAL at 08:48

## 2025-02-04 RX ADMIN — ACETAMINOPHEN 650 MG: 325 TABLET, FILM COATED ORAL at 05:53

## 2025-02-04 RX ADMIN — ESCITALOPRAM OXALATE 40 MG: 20 TABLET ORAL at 08:48

## 2025-02-04 RX ADMIN — CEFAZOLIN SODIUM 2 G: 2 INJECTION, SOLUTION INTRAVENOUS at 02:04

## 2025-02-04 RX ADMIN — ACETAMINOPHEN 650 MG: 325 TABLET, FILM COATED ORAL at 11:59

## 2025-02-04 RX ADMIN — METHOCARBAMOL TABLETS 500 MG: 500 TABLET, COATED ORAL at 05:53

## 2025-02-04 RX ADMIN — ACETAMINOPHEN 650 MG: 325 TABLET, FILM COATED ORAL at 00:31

## 2025-02-04 RX ADMIN — KETOROLAC TROMETHAMINE 15 MG: 30 INJECTION, SOLUTION INTRAMUSCULAR at 18:47

## 2025-02-04 RX ADMIN — MAGNESIUM SULFATE HEPTAHYDRATE 2 G: 40 INJECTION, SOLUTION INTRAVENOUS at 08:47

## 2025-02-04 RX ADMIN — ATORVASTATIN CALCIUM 10 MG: 10 TABLET, FILM COATED ORAL at 22:15

## 2025-02-04 RX ADMIN — ENOXAPARIN SODIUM 40 MG: 40 INJECTION SUBCUTANEOUS at 22:17

## 2025-02-04 SDOH — ECONOMIC STABILITY: TRANSPORTATION INSECURITY: IN THE PAST 12 MONTHS, HAS LACK OF TRANSPORTATION KEPT YOU FROM MEDICAL APPOINTMENTS OR FROM GETTING MEDICATIONS?: NO

## 2025-02-04 SDOH — ECONOMIC STABILITY: HOUSING INSECURITY: AT ANY TIME IN THE PAST 12 MONTHS, WERE YOU HOMELESS OR LIVING IN A SHELTER (INCLUDING NOW)?: NO

## 2025-02-04 SDOH — ECONOMIC STABILITY: HOUSING INSECURITY: IN THE LAST 12 MONTHS, WAS THERE A TIME WHEN YOU WERE NOT ABLE TO PAY THE MORTGAGE OR RENT ON TIME?: NO

## 2025-02-04 SDOH — ECONOMIC STABILITY: HOUSING INSECURITY: IN THE PAST 12 MONTHS, HOW MANY TIMES HAVE YOU MOVED WHERE YOU WERE LIVING?: 0

## 2025-02-04 SDOH — ECONOMIC STABILITY: FOOD INSECURITY: HOW HARD IS IT FOR YOU TO PAY FOR THE VERY BASICS LIKE FOOD, HOUSING, MEDICAL CARE, AND HEATING?: NOT HARD AT ALL

## 2025-02-04 SDOH — HEALTH STABILITY: PHYSICAL HEALTH: ON AVERAGE, HOW MANY DAYS PER WEEK DO YOU ENGAGE IN MODERATE TO STRENUOUS EXERCISE (LIKE A BRISK WALK)?: 0 DAYS

## 2025-02-04 SDOH — HEALTH STABILITY: PHYSICAL HEALTH: ON AVERAGE, HOW MANY MINUTES DO YOU ENGAGE IN EXERCISE AT THIS LEVEL?: 0 MIN

## 2025-02-04 ASSESSMENT — COGNITIVE AND FUNCTIONAL STATUS - GENERAL
DRESSING REGULAR LOWER BODY CLOTHING: A LITTLE
MOVING FROM LYING ON BACK TO SITTING ON SIDE OF FLAT BED WITH BEDRAILS: A LITTLE
HELP NEEDED FOR BATHING: A LITTLE
STANDING UP FROM CHAIR USING ARMS: A LITTLE
TOILETING: A LITTLE
MOBILITY SCORE: 20
TURNING FROM BACK TO SIDE WHILE IN FLAT BAD: A LITTLE
MOVING TO AND FROM BED TO CHAIR: A LITTLE
DAILY ACTIVITIY SCORE: 21

## 2025-02-04 ASSESSMENT — PAIN SCALES - GENERAL
PAINLEVEL_OUTOF10: 4
PAINLEVEL_OUTOF10: 2
PAINLEVEL_OUTOF10: 3
PAINLEVEL_OUTOF10: 5 - MODERATE PAIN
PAINLEVEL_OUTOF10: 4
PAINLEVEL_OUTOF10: 2
PAINLEVEL_OUTOF10: 6
PAINLEVEL_OUTOF10: 8
PAINLEVEL_OUTOF10: 2

## 2025-02-04 ASSESSMENT — PAIN - FUNCTIONAL ASSESSMENT
PAIN_FUNCTIONAL_ASSESSMENT: 0-10

## 2025-02-04 ASSESSMENT — ACTIVITIES OF DAILY LIVING (ADL)
LACK_OF_TRANSPORTATION: NO
LACK_OF_TRANSPORTATION: NO

## 2025-02-04 ASSESSMENT — PAIN DESCRIPTION - LOCATION
LOCATION: RIB CAGE
LOCATION: INCISION
LOCATION: RIB CAGE
LOCATION: CHEST

## 2025-02-04 ASSESSMENT — PAIN SCALES - PAIN ASSESSMENT IN ADVANCED DEMENTIA (PAINAD)
TOTALSCORE: MEDICATION (SEE MAR)

## 2025-02-04 ASSESSMENT — PAIN DESCRIPTION - ORIENTATION
ORIENTATION: RIGHT

## 2025-02-04 NOTE — CARE PLAN
The patient's goals for the shift include      The clinical goals for the shift include pt will remain hemodynamically stable    Problem: Pain - Adult  Goal: Verbalizes/displays adequate comfort level or baseline comfort level  Outcome: Progressing     Problem: Safety - Adult  Goal: Free from fall injury  Outcome: Progressing     Problem: Discharge Planning  Goal: Discharge to home or other facility with appropriate resources  Outcome: Progressing     Problem: Chronic Conditions and Co-morbidities  Goal: Patient's chronic conditions and co-morbidity symptoms are monitored and maintained or improved  Outcome: Progressing     Problem: Nutrition  Goal: Nutrient intake appropriate for maintaining nutritional needs  Outcome: Progressing     Problem: Respiratory  Goal: Clear secretions with interventions this shift  Outcome: Progressing  Goal: Minimize anxiety/maximize coping throughout shift  Outcome: Progressing  Goal: Minimal/no exertional discomfort or dyspnea this shift  Outcome: Progressing  Goal: No signs of respiratory distress (eg. Use of accessory muscles. Peds grunting)  Outcome: Progressing  Goal: Patent airway maintained this shift  Outcome: Progressing  Goal: Tolerate mechanical ventilation evidenced by VS/agitation level this shift  Outcome: Progressing  Goal: Tolerate pulmonary toileting this shift  Outcome: Progressing  Goal: Verbalize decreased shortness of breath this shift  Outcome: Progressing  Goal: Wean oxygen to maintain O2 saturation per order/standard this shift  Outcome: Progressing  Goal: Increase self care and/or family involvement in next 24 hours  Outcome: Progressing      No abnormalities noted

## 2025-02-04 NOTE — PROGRESS NOTES
02/04/25 1437   Discharge Planning   Living Arrangements Spouse/significant other   Support Systems Spouse/significant other   Assistance Needed Independent, drives   Type of Residence Private residence   Number of Stairs to Enter Residence 0   Number of Stairs Within Residence 12   Do you have animals or pets at home? Yes   Type of Animals or Pets two dogs   Who is requesting discharge planning? Provider   Home or Post Acute Services None   Expected Discharge Disposition Home   Does the patient need discharge transport arranged? Yes   RoundTrip coordination needed? Yes   Has discharge transport been arranged? No   Financial Resource Strain   How hard is it for you to pay for the very basics like food, housing, medical care, and heating? Not hard   Housing Stability   In the last 12 months, was there a time when you were not able to pay the mortgage or rent on time? N   In the past 12 months, how many times have you moved where you were living? 0   At any time in the past 12 months, were you homeless or living in a shelter (including now)? N   Transportation Needs   In the past 12 months, has lack of transportation kept you from medical appointments or from getting medications? no   In the past 12 months, has lack of transportation kept you from meetings, work, or from getting things needed for daily living? No   Patient Choice   Provider Choice list and CMS website (https://medicare.gov/care-compare#search) for post-acute Quality and Resource Measure Data were provided and reviewed with: Patient   Patient / Family choosing to utilize agency / facility established prior to hospitalization No   Stroke Family Assessment   Stroke Family Assessment Needed No   Intensity of Service   Intensity of Service 0-30 min        Met with patient at bedside and explained my role as care coordinator. She lives in the house with her . She is independent with her care at home. She drives. Patient denies use of any  ambulatory devices. No oxygen in use at home. No HD. Her PCP is Dr. Santos Swenson and she seen him couple months ago. Pharmacy she uses is [facility]. She is able to afford medications and to get to her doctors appointments. Patient had right VATS procedure done yesterday and has chest tube to water seal. Patient denies any needs going home.

## 2025-02-04 NOTE — PROGRESS NOTES
Pharmacy Medication History     Source of Information: patient    Additional concerns with the patient's PTA list.   N/a  The following updates were made to the Prior to Admission medication list:     Medications ADDED:   N/a  Medications CHANGED:  N/a  Medications REMOVED:   N/a  Medications NOT TAKING:   nicotine    Allergy reviewed : Yes    Meds 2 Beds : No    Outpatient pharmacy confirmed and updated in chart : Yes    Pharmacy name: [facility]    The list below reflectives the updated PTA list. Please review each medication in order reconciliation for additional clarification and justification.    Prior to Admission Medications   Prescriptions Last Dose Informant   amLODIPine (Norvasc) 5 mg tablet 2/3/2025 Morning Self   Sig: Take 1 tablet (5 mg) by mouth once daily.   atorvastatin (Lipitor) 10 mg tablet 2/3/2025 Morning Self   Sig: Take 1 tablet (10 mg) by mouth once daily.   escitalopram (Lexapro) 20 mg tablet 2/3/2025 Morning Self   Sig: Take 2 tablets (40 mg) by mouth once daily.   estradiol (Estrace) 0.01 % (0.1 mg/gram) vaginal cream Past Week Self   Sig: Insert 0.125 Applicatorfuls (0.5 g) into the vagina 3 times a week. Apply pea sized amount to vaginal opening every Monday, Wednesday, and Friday evening   ketoconazole (NIZOral) 2 % cream 2/2/2025 Self   Sig: Apply twice daily to affected areas of face   traZODone (Desyrel) 100 mg tablet 2/2/2025 Self   Sig: TAKE 2 TABLETS ONCE DAILY  AT BEDTIME   tretinoin (Retin-A) 0.025 % cream 2/2/2025 Self   Sig: Apply 1 Application topically once daily at bedtime. Start 1-2 nights per week 1-2 weeks, inc to every other night, then every night as tolerated   triamterene-hydrochlorothiazid (Maxzide-25) 37.5-25 mg tablet 2/3/2025 Morning Self   Sig: Take 1 tablet by mouth once daily.   zolpidem (Ambien) 10 mg tablet 2/2/2025 Self   Sig: Take 1 tablet (10 mg) by mouth as needed at bedtime for sleep.      Facility-Administered Medications: None       The list  below reflectives the updated allergy list. Please review each documented allergy for additional clarification and justification.    Allergies   Allergen Reactions    Bupropion Hives    Sulfa (Sulfonamide Antibiotics) Unknown    Sulfamethoxazole-Trimethoprim Hives          02/04/25 at 12:49 PM - Stanton Gomes

## 2025-02-04 NOTE — PROGRESS NOTES
"Gifty Shaw is a 64 y.o. female on day 1 of admission presenting with Lung nodules    Subjective   Alert, pain controlled denies sob/ dyspnea    Objective     Physical Exam  Vitals and nursing note reviewed.   Constitutional:       Appearance: Normal appearance.   Eyes:      Conjunctiva/sclera: Conjunctivae normal.   Cardiovascular:      Rate and Rhythm: Normal rate and regular rhythm.      Comments: Sr rare PVC  Pulmonary:      Effort: Pulmonary effort is normal.      Breath sounds: Normal breath sounds.      Comments: Right pleural CT H20 seal intermittent 1 chamber air leak  Abdominal:      General: Abdomen is flat.      Palpations: Abdomen is soft.   Genitourinary:     Comments: Reports voiding overnight  Musculoskeletal:         General: Normal range of motion.   Skin:     General: Skin is warm and dry.   Neurological:      General: No focal deficit present.      Mental Status: She is alert and oriented to person, place, and time.             Last Recorded Vitals   Blood pressure 122/66, pulse 63, temperature 37.1 °C (98.8 °F), temperature source Temporal, resp. rate 18, height 1.549 m (5' 1\"), weight 45.1 kg (99 lb 6.8 oz), last menstrual period 01/01/2004, SpO2 93%.    Intake/Output Last 3 Shifts  I/O last 3 completed shifts:  In: 1810 (40.1 mL/kg) [P.O.:570; I.V.:900 (20 mL/kg); IV Piggyback:340]  Out: 780 (17.3 mL/kg) [Urine:500 (0.3 mL/kg/hr); Blood:10; Chest Tube:270]  Weight: 45.1 kg     Lines  Chest Tube Right 28 Fr (Active)   Placement Date/Time: 02/03/25 1114   Placed by: Mushtaq  Chest Tube Orientation: Right  Chest Tube Drain Tube Size (Fr): 28 Fr  Chest Tube Drainage System: Dry seal chest drain   Number of days: 0                     Recent Labs  CMP:  Results from last 7 days   Lab Units 02/04/25  0504   SODIUM mmol/L 137   POTASSIUM mmol/L 3.2*   CHLORIDE mmol/L 101   CO2 mmol/L 29   ANION GAP mmol/L 10   BUN mg/dL 10   CREATININE mg/dL 0.69   EGFR mL/min/1.73m*2 >90   MAGNESIUM mg/dL " 1.76     CBC:  Results from last 7 days   Lab Units 02/04/25  0504   WBC AUTO x10*3/uL 14.6*   HEMOGLOBIN g/dL 11.3*   HEMATOCRIT % 35.1*   PLATELETS AUTO x10*3/uL 242   MCV fL 101*     COAG:     HEME/ENDO:     CARDIAC:        Imaging  XR chest 1 view   Final Result   Surgical changes from recent right upper lobe wedge resection and   right mediastinal lymph node resection. Stable right-sided chest   tube. There is a right upper chest pneumothorax.        Stable findings of prior granulomatous disease on the right as   described.        Mild right chest wall subcutaneous emphysema.        Left lower lobe atelectasis.        MACRO:   None        Signed by: Don Rojo 2/4/2025 8:14 AM   Dictation workstation:   [id number]      XR chest 1 view   Final Result   Immediately status post right upper lobe wedge resection and right   mediastinal lymph node resection.        Right-sided chest tube. No pneumothorax. Mild mid to lower lateral   right chest wall subcutaneous emphysema.        Postsurgical fullness in the upper right side of the mediastinum with   surgical clips in place.        Stable findings of prior granulomatous disease on the right as   described.        MACRO:   None        Signed by: Don Rojo 2/3/2025 12:28 PM   Dictation workstation:   [id number]           Medications  Scheduled medications  acetaminophen, 650 mg, oral, q6h  amLODIPine, 5 mg, oral, Daily  atorvastatin, 10 mg, oral, Nightly  enoxaparin, 40 mg, subcutaneous, q24h  escitalopram, 40 mg, oral, Daily  ketorolac, 15 mg, intravenous, q6h  lidocaine, 1 patch, transdermal, Daily  magnesium sulfate, 2 g, intravenous, Once  methocarbamol, 500 mg, oral, q8h BAILEY  oxygen, , inhalation, Continuous - Inhalation  polyethylene glycol, 17 g, oral, Daily  potassium chloride CR, 40 mEq, oral, Once  sennosides-docusate sodium, 2 tablet, oral, BID  triamterene-hydrochlorothiazid, 1 tablet, oral, Daily      Continuous medications     PRN  medications  PRN medications: naloxone, ondansetron ODT **OR** ondansetron, oxyCODONE, oxyCODONE, phenoL    Assessment/Plan   Assessment & Plan  Lung nodules    HTN (hypertension)    Eczema    Gifty Shaw is a 64 y.o. female on day 0 of admission presenting with Lung nodules 2/3 s/p Right Thoracoscopy upper lobe wedge resection, mediastinal lymphadenectomy with Dr. Landin.   Preliminary pathology likely adenocarcinoma.  PMH notable for current 25ppy smoker & HTN.        Neuro: expected Post op pain  - PRN oxy & Dilaudid for breakthrough  - Scheduled Tylenol, robaxin, toradol & Lidoderm patch  - home lexapro; resume trazodone tonight     Cardiac: HTN  - Continuous Cardiac Monitor  - home norvasc, atorvastatin, maxzide     Respiratory;  hypoxic episode after coughing requiring BVM in PACU; 2/4 RA; CXR with expected small right pneumothorax on H2O seal, CT with intermittent air leak  - Maintain Chest Tube H2O seal   - Daily Chest X-ray while chest tube in place  - OOB to chair & ambulate in halls  - IS and acapella Q1H      GI:  - tolerating oral diet  - Bowel Regimen     Renal:  patient reports voiding  Hypokalemia -  replace potassium & magnesium, repeat renal panel in AM      Heme:  - DVT PPx- SCD; Lovenox   ID afebrile, leukocytosis reactive  - Post op Ancef x 24 hours     Dispo: SDU; Maintain CT, possible home tomorrow, Dr. Landin follow up made     Patient seen & discussed with Dr. Landin.  POC discussed with patient, questions answered.    Time spent on the assessment of patient, gathering and interpreting data, review of medical record/patient history, personally reviewing radiographic imaging and formulation of this note. With greater than 50% spent in personal discussion with patient/ family.  Time:45      Rocio GLASS Cap, APRN-CNP

## 2025-02-04 NOTE — CARE PLAN
The patient's goals for the shift include      The clinical goals for the shift include  to remain safe this shift and remain hemodynamically stable.    Over the shift, the patient did not make progress toward the following goals. Barriers to progression include   Problem: Pain - Adult  Goal: Verbalizes/displays adequate comfort level or baseline comfort level  Outcome: Progressing     Problem: Safety - Adult  Goal: Free from fall injury  Outcome: Progressing     Problem: Discharge Planning  Goal: Discharge to home or other facility with appropriate resources  Outcome: Progressing     Problem: Chronic Conditions and Co-morbidities  Goal: Patient's chronic conditions and co-morbidity symptoms are monitored and maintained or improved  Outcome: Progressing     Problem: Nutrition  Goal: Nutrient intake appropriate for maintaining nutritional needs  Outcome: Progressing     Problem: Respiratory  Goal: Clear secretions with interventions this shift  Outcome: Progressing  Goal: Minimize anxiety/maximize coping throughout shift  Outcome: Progressing  Goal: Minimal/no exertional discomfort or dyspnea this shift  Outcome: Progressing  Goal: No signs of respiratory distress (eg. Use of accessory muscles. Peds grunting)  Outcome: Progressing  Goal: Patent airway maintained this shift  Outcome: Progressing  Goal: Tolerate mechanical ventilation evidenced by VS/agitation level this shift  Outcome: Progressing  Goal: Tolerate pulmonary toileting this shift  Outcome: Progressing  Goal: Verbalize decreased shortness of breath this shift  Outcome: Progressing  Goal: Wean oxygen to maintain O2 saturation per order/standard this shift  Outcome: Progressing  Goal: Increase self care and/or family involvement in next 24 hours  Outcome: Progressing   . Recommendations to address these barriers include .

## 2025-02-04 NOTE — PROGRESS NOTES
02/04/25 1436   [facility] Disability Status   Are you deaf or do you have serious difficulty hearing? N   Are you blind or do you have serious difficulty seeing, even when wearing glasses? N   Because of a physical, mental, or emotional condition, do you have serious difficulty concentrating, remembering, or making decisions? (5 years old or older) N   Do you have serious difficulty walking or climbing stairs? N   Do you have serious difficulty dressing or bathing? N   Because of a physical, mental, or emotional condition, do you have serious difficulty doing errands alone such as visiting the doctor? N

## 2025-02-05 ENCOUNTER — APPOINTMENT (OUTPATIENT)
Dept: RADIOLOGY | Facility: HOSPITAL | Age: 65
End: 2025-02-05
Payer: COMMERCIAL

## 2025-02-05 LAB
ANION GAP SERPL CALC-SCNC: 8 MMOL/L (ref 10–20)
BUN SERPL-MCNC: 12 MG/DL (ref 6–23)
CALCIUM SERPL-MCNC: 8.5 MG/DL (ref 8.6–10.3)
CHLORIDE SERPL-SCNC: 104 MMOL/L (ref 98–107)
CO2 SERPL-SCNC: 30 MMOL/L (ref 21–32)
CREAT SERPL-MCNC: 0.63 MG/DL (ref 0.5–1.05)
EGFRCR SERPLBLD CKD-EPI 2021: >90 ML/MIN/1.73M*2
ERYTHROCYTE [DISTWIDTH] IN BLOOD BY AUTOMATED COUNT: 12.7 % (ref 11.5–14.5)
GLUCOSE SERPL-MCNC: 107 MG/DL (ref 74–99)
HCT VFR BLD AUTO: 31 % (ref 36–46)
HGB BLD-MCNC: 10.3 G/DL (ref 12–16)
MAGNESIUM SERPL-MCNC: 1.97 MG/DL (ref 1.6–2.4)
MCH RBC QN AUTO: 33.2 PG (ref 26–34)
MCHC RBC AUTO-ENTMCNC: 33.2 G/DL (ref 32–36)
MCV RBC AUTO: 100 FL (ref 80–100)
NRBC BLD-RTO: 0 /100 WBCS (ref 0–0)
PLATELET # BLD AUTO: 222 X10*3/UL (ref 150–450)
POTASSIUM SERPL-SCNC: 3.9 MMOL/L (ref 3.5–5.3)
RBC # BLD AUTO: 3.1 X10*6/UL (ref 4–5.2)
SODIUM SERPL-SCNC: 138 MMOL/L (ref 136–145)
WBC # BLD AUTO: 11.9 X10*3/UL (ref 4.4–11.3)

## 2025-02-05 PROCEDURE — 82374 ASSAY BLOOD CARBON DIOXIDE: CPT | Performed by: NURSE PRACTITIONER

## 2025-02-05 PROCEDURE — 2060000001 HC INTERMEDIATE ICU ROOM DAILY

## 2025-02-05 PROCEDURE — 99232 SBSQ HOSP IP/OBS MODERATE 35: CPT | Performed by: NURSE PRACTITIONER

## 2025-02-05 PROCEDURE — 80048 BASIC METABOLIC PNL TOTAL CA: CPT | Performed by: NURSE PRACTITIONER

## 2025-02-05 PROCEDURE — 71045 X-RAY EXAM CHEST 1 VIEW: CPT

## 2025-02-05 PROCEDURE — 2500000001 HC RX 250 WO HCPCS SELF ADMINISTERED DRUGS (ALT 637 FOR MEDICARE OP): Performed by: NURSE PRACTITIONER

## 2025-02-05 PROCEDURE — 83735 ASSAY OF MAGNESIUM: CPT | Performed by: NURSE PRACTITIONER

## 2025-02-05 PROCEDURE — 71045 X-RAY EXAM CHEST 1 VIEW: CPT | Performed by: RADIOLOGY

## 2025-02-05 PROCEDURE — 36415 COLL VENOUS BLD VENIPUNCTURE: CPT | Performed by: NURSE PRACTITIONER

## 2025-02-05 PROCEDURE — 2500000004 HC RX 250 GENERAL PHARMACY W/ HCPCS (ALT 636 FOR OP/ED): Performed by: NURSE PRACTITIONER

## 2025-02-05 PROCEDURE — 2500000005 HC RX 250 GENERAL PHARMACY W/O HCPCS: Performed by: NURSE PRACTITIONER

## 2025-02-05 PROCEDURE — 85027 COMPLETE CBC AUTOMATED: CPT | Performed by: NURSE PRACTITIONER

## 2025-02-05 RX ADMIN — ACETAMINOPHEN 650 MG: 325 TABLET, FILM COATED ORAL at 06:12

## 2025-02-05 RX ADMIN — ATORVASTATIN CALCIUM 10 MG: 10 TABLET, FILM COATED ORAL at 20:54

## 2025-02-05 RX ADMIN — SENNOSIDES AND DOCUSATE SODIUM 2 TABLET: 50; 8.6 TABLET ORAL at 20:54

## 2025-02-05 RX ADMIN — POLYETHYLENE GLYCOL 3350 17 G: 17 POWDER, FOR SOLUTION ORAL at 09:39

## 2025-02-05 RX ADMIN — OXYCODONE HYDROCHLORIDE 5 MG: 5 TABLET ORAL at 16:10

## 2025-02-05 RX ADMIN — METHOCARBAMOL TABLETS 500 MG: 500 TABLET, COATED ORAL at 14:17

## 2025-02-05 RX ADMIN — ACETAMINOPHEN 650 MG: 325 TABLET, FILM COATED ORAL at 18:13

## 2025-02-05 RX ADMIN — LIDOCAINE 4% 1 PATCH: 40 PATCH TOPICAL at 09:36

## 2025-02-05 RX ADMIN — ESCITALOPRAM OXALATE 40 MG: 20 TABLET ORAL at 09:37

## 2025-02-05 RX ADMIN — ACETAMINOPHEN 650 MG: 325 TABLET, FILM COATED ORAL at 12:14

## 2025-02-05 RX ADMIN — TRAZODONE HYDROCHLORIDE 200 MG: 50 TABLET ORAL at 20:54

## 2025-02-05 RX ADMIN — TRIAMTERENE AND HYDROCHLOROTHIAZIDE 1 TABLET: 37.5; 25 TABLET ORAL at 09:36

## 2025-02-05 RX ADMIN — SENNOSIDES AND DOCUSATE SODIUM 2 TABLET: 50; 8.6 TABLET ORAL at 09:36

## 2025-02-05 RX ADMIN — OXYCODONE HYDROCHLORIDE 10 MG: 5 TABLET ORAL at 06:16

## 2025-02-05 RX ADMIN — METHOCARBAMOL TABLETS 500 MG: 500 TABLET, COATED ORAL at 06:12

## 2025-02-05 RX ADMIN — AMLODIPINE BESYLATE 5 MG: 5 TABLET ORAL at 09:37

## 2025-02-05 RX ADMIN — ENOXAPARIN SODIUM 40 MG: 40 INJECTION SUBCUTANEOUS at 20:54

## 2025-02-05 RX ADMIN — ACETAMINOPHEN 650 MG: 325 TABLET, FILM COATED ORAL at 23:41

## 2025-02-05 RX ADMIN — METHOCARBAMOL TABLETS 500 MG: 500 TABLET, COATED ORAL at 21:17

## 2025-02-05 ASSESSMENT — PAIN SCALES - GENERAL
PAINLEVEL_OUTOF10: 3
PAINLEVEL_OUTOF10: 7
PAINLEVEL_OUTOF10: 2
PAINLEVEL_OUTOF10: 0 - NO PAIN
PAINLEVEL_OUTOF10: 2
PAINLEVEL_OUTOF10: 4

## 2025-02-05 ASSESSMENT — COGNITIVE AND FUNCTIONAL STATUS - GENERAL
DAILY ACTIVITIY SCORE: 24
MOBILITY SCORE: 24
DAILY ACTIVITIY SCORE: 24
MOBILITY SCORE: 24
DAILY ACTIVITIY SCORE: 24
MOBILITY SCORE: 24

## 2025-02-05 ASSESSMENT — PAIN DESCRIPTION - LOCATION
LOCATION: RIB CAGE
LOCATION: RIB CAGE

## 2025-02-05 ASSESSMENT — PAIN DESCRIPTION - ORIENTATION
ORIENTATION: RIGHT
ORIENTATION: RIGHT

## 2025-02-05 ASSESSMENT — PAIN - FUNCTIONAL ASSESSMENT
PAIN_FUNCTIONAL_ASSESSMENT: 0-10
PAIN_FUNCTIONAL_ASSESSMENT: 0-10

## 2025-02-05 NOTE — CARE PLAN
Problem: Pain - Adult  Goal: Verbalizes/displays adequate comfort level or baseline comfort level  Outcome: Progressing     Problem: Safety - Adult  Goal: Free from fall injury  Outcome: Progressing     Problem: Discharge Planning  Goal: Discharge to home or other facility with appropriate resources  Outcome: Progressing     Problem: Chronic Conditions and Co-morbidities  Goal: Patient's chronic conditions and co-morbidity symptoms are monitored and maintained or improved  Outcome: Progressing     Problem: Nutrition  Goal: Nutrient intake appropriate for maintaining nutritional needs  Outcome: Progressing     Problem: Respiratory  Goal: Clear secretions with interventions this shift  Outcome: Progressing  Goal: Minimize anxiety/maximize coping throughout shift  Outcome: Progressing  Goal: Minimal/no exertional discomfort or dyspnea this shift  Outcome: Progressing  Goal: No signs of respiratory distress (eg. Use of accessory muscles. Peds grunting)  Outcome: Progressing  Goal: Patent airway maintained this shift  Outcome: Progressing  Goal: Tolerate mechanical ventilation evidenced by VS/agitation level this shift  Outcome: Progressing  Goal: Tolerate pulmonary toileting this shift  Outcome: Progressing  Goal: Verbalize decreased shortness of breath this shift  Outcome: Progressing  Goal: Wean oxygen to maintain O2 saturation per order/standard this shift  Outcome: Progressing  Goal: Increase self care and/or family involvement in next 24 hours  Outcome: Progressing   The patient's goals for the shift include      The clinical goals for the shift include pt will remain hemodynamically stable

## 2025-02-05 NOTE — PROGRESS NOTES
[facility] Thoracic Surgery Progress Note       Date:  2/5/2025  Patient:  Gifty Shaw  YOB: 1960  MRN:  [id number]   Admit Date:  2/3/2025  Hospital Length of Stay: 2       History of Present Illness:  Gifty Shaw is a 64 y.o. year old female patient with Past Medical History of  hypertension and lung nodules s/p Right Thoracoscopy upper lobe wedge resection, mediastinal lymphadenectomy with Dr. Landin from 2/3/24.       Subjective   -Hemodynamically stable overnight, denies any new or concerning symptoms  -Reports improved sleep hygiene overnight   -CT with 75ml out overnight     Medical History:  Past Medical History:   Diagnosis Date    Eczema     Encounter for gynecological examination (general) (routine) without abnormal findings 05/10/2014    Encounter for gynecological examination without abnormal finding    Encounter for screening for malignant neoplasm of vagina     Vaginal Pap smear    Essential (primary) hypertension 08/01/2013    Benign essential hypertension    Other conditions influencing health status     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 90 tablet 3 2/3/2025 Morning    atorvastatin (Lipitor) 10 mg tablet Take 1 tablet (10 mg) by mouth once daily. 90 tablet 3 2/3/2025 Morning    escitalopram (Lexapro) 20 mg tablet Take 2 tablets (40 mg) by mouth once daily. 180 tablet 3 2/3/2025 Morning    estradiol (Estrace) 0.01 % (0.1  mg/gram) vaginal cream Insert 0.125 Applicatorfuls (0.5 g) into the vagina 3 times a week. Apply pea sized amount to vaginal opening every Monday, Wednesday, and Friday evening 42.5 g 3 Past Week    ketoconazole (NIZOral) 2 % cream Apply twice daily to affected areas of face 60 g 11 2/2/2025    traZODone (Desyrel) 100 mg tablet TAKE 2 TABLETS ONCE DAILY  AT BEDTIME 180 tablet 3 2/2/2025    tretinoin (Retin-A) 0.025 % cream Apply 1 Application topically once daily at bedtime. Start 1-2 nights per week 1-2 weeks, inc to every other night, then every night as tolerated 45 g 11 2/2/2025    triamterene-hydrochlorothiazid (Maxzide-25) 37.5-25 mg tablet Take 1 tablet by mouth once daily. 90 tablet 3 2/3/2025 Morning    zolpidem (Ambien) 10 mg tablet Take 1 tablet (10 mg) by mouth as needed at bedtime for sleep. 30 tablet 0 2/2/2025    nicotine (Nicoderm CQ) 7 mg/24 hr patch Place 1 patch on the skin once daily. (Patient not taking: Reported on 2/3/2025)   More than a month     Bupropion, Sulfa (sulfonamide antibiotics), and Sulfamethoxazole-trimethoprim  Social History     Tobacco Use    Smoking status: Every Day     Current packs/day: 0.50     Average packs/day: 0.5 packs/day for 40.1 years (20.0 ttl pk-yrs)     Types: Cigarettes     Start date: 1/7/1985    Smokeless tobacco: Never   Vaping Use    Vaping status: Never Used   Substance Use Topics    Alcohol use: Yes     Alcohol/week: 5.0 - 6.0 standard drinks of alcohol     Types: 5 - 6 Glasses of wine per week    Drug use: Yes     Types: Marijuana     Comment: gummies     Family History   Problem Relation Name Age of Onset    Other (cardiac disorder) Father      Colon cancer Other      Hypertension Other            Objective   Hospital Medications:    Current Facility-Administered Medications:     acetaminophen (Tylenol) tablet 650 mg, 650 mg, oral, q6h, Rocio GLASS Cap, APRN-CNP, 650 mg at 02/05/25 0612    amLODIPine (Norvasc) tablet 5 mg, 5 mg, oral, Daily, Rocio T Cap,  APRN-CNP, 5 mg at 02/04/25 0848    atorvastatin (Lipitor) tablet 10 mg, 10 mg, oral, Nightly, Rocio GLASS Cap, APRN-CNP, 10 mg at 02/04/25 2215    enoxaparin (Lovenox) syringe 40 mg, 40 mg, subcutaneous, q24h, Rocio GLASS Cap, APRN-CNP, 40 mg at 02/04/25 2217    escitalopram (Lexapro) tablet 40 mg, 40 mg, oral, Daily, Rocio GLASS Cap, APRN-CNP, 40 mg at 02/04/25 0848    lidocaine 4 % patch 1 patch, 1 patch, transdermal, Daily, Rocio GLASS Cap, APRN-CNP, 1 patch at 02/04/25 0847    methocarbamol (Robaxin) tablet 500 mg, 500 mg, oral, q8h BAILEY, Rocio GLASS Cap, APRN-CNP, 500 mg at 02/05/25 0612    naloxone (Narcan) injection 0.2 mg, 0.2 mg, intravenous, q5 min PRN, MARITZA Walker Cap-CNP    ondansetron ODT (Zofran-ODT) disintegrating tablet 4 mg, 4 mg, oral, q8h PRN **OR** ondansetron (Zofran) injection 4 mg, 4 mg, intravenous, q8h PRN, Rocio GLASS Cap, APRN-CNP    oxyCODONE (Roxicodone) immediate release tablet 10 mg, 10 mg, oral, q6h PRN, Rocio GLASS Cap, APRN-CNP, 10 mg at 02/05/25 0616    oxyCODONE (Roxicodone) immediate release tablet 5 mg, 5 mg, oral, q6h PRN, Rocio GLASS Cap, APRN-CNP, 5 mg at 02/04/25 1648    phenoL (Chloraseptic) 1.4 % mouth/throat spray 1 spray, 1 spray, Mouth/Throat, q2 min PRN, Rocio GLASS Cap, APRN-CNP    polyethylene glycol (Glycolax, Miralax) packet 17 g, 17 g, oral, Daily, Rocio GLASS Cap, APRN-CNP, 17 g at 02/04/25 0848    sennosides-docusate sodium (Medina-Colace) 8.6-50 mg per tablet 2 tablet, 2 tablet, oral, BID, Rocio T Cap, APRN-CNP, 2 tablet at 02/03/25 2223    traZODone (Desyrel) tablet 200 mg, 200 mg, oral, Nightly, Rocio T Cap, APRN-CNP, 200 mg at 02/04/25 2215    triamterene-hydrochlorothiazid (Maxzide-25) 37.5-25 mg per tablet 1 tablet, 1 tablet, oral, Daily, Rocio T Charly, APRN-CNP, 1 tablet at 02/04/25 0848    Review of Systems:  14 point review of systems was completed and negative except for those specially mention in my HPI       Objective     Physical Exam:    Heart Rate:  [81]   Temp:   [36.4 °C (97.5 °F)-37.1 °C (98.8 °F)]   Resp:  [17-18]   BP: ()/(58-70)   SpO2:  [92 %-99 %]     Physical Exam  Constitutional:       Appearance: Normal appearance.   HENT:      Mouth/Throat:      Mouth: Mucous membranes are moist.   Eyes:      Extraocular Movements: Extraocular movements intact.      Conjunctiva/sclera: Conjunctivae normal.   Cardiovascular:      Rate and Rhythm: Normal rate and regular rhythm.   Pulmonary:      Effort: Pulmonary effort is normal.      Comments: Diminished throughout   Right chest tube to atrium on waterseal   -Minimal serosanguinous output  -+Airleak - continuously   Abdominal:      General: Bowel sounds are normal.      Palpations: Abdomen is soft.   Skin:     General: Skin is warm and dry.      Capillary Refill: Capillary refill takes less than 2 seconds.      Comments: Chest tube dressing c/d/I   No crepitus    Neurological:      General: No focal deficit present.      Mental Status: She is alert and oriented to person, place, and time.   Psychiatric:         Mood and Affect: Mood normal.         Objective:    I have reviewed all medications, laboratory results, and imaging pertinent for today's encounter.           Intake/Output Summary (Last 24 hours) at 2/5/2025 0814  Last data filed at 2/5/2025 0709  Gross per 24 hour   Intake 50 ml   Output 360 ml   Net -310 ml       Daily Labs:  CBC:   Results from last 7 days   Lab Units 02/05/25  0540 02/04/25  0504   WBC AUTO x10*3/uL 11.9* 14.6*   HEMOGLOBIN g/dL 10.3* 11.3*   HEMATOCRIT % 31.0* 35.1*   MCV fL 100 101*     BMP:    Results from last 7 days   Lab Units 02/05/25  0540 02/04/25  0504   SODIUM mmol/L 138 137   POTASSIUM mmol/L 3.9 3.2*   CHLORIDE mmol/L 104 101   CO2 mmol/L 30 29   BUN mg/dL 12 10   CREATININE mg/dL 0.63 0.69   CALCIUM mg/dL 8.5* 8.7   GLUCOSE mg/dL 107* 98   MAGNESIUM mg/dL 1.97 1.76       Assessment/Plan:    I am currently managing this critically ill patient for the following  problems:    Neuro/Psych/Pain Ctrl/Sedation:  -Pain Control: Schedule tylenol, lidocaine patch and robaxin/PRN oxycodone   -Promote Sleep/wake hygiene  -Serial pain assessments   -Trazodone   -Continue lexapro    Cardiovascular:  HTN  -Maintain MAP > 65   -Continuous Cardiac Monitoring   -Continue triamterene/hydrochlorothiazide and amlodipine     Respiratory/ENT:  Lung nodule s/p lung nodules s/p Right Thoracoscopy upper lobe wedge resection, mediastinal lymphadenectomy  CXR this morning showed a stable moderate right apical pneumothorax with chest tube in place. Patient with ongoing airleak this morning   -Maintain SPO2 > 92%  -Promote Pulm Hygiene Daily   -Monitor CT output   -CT to waterseal, due to airleak will maintain today   -Daily CXR while CT in place   -Encourage frequent ambulation   -IS     Renal/Volume Status (Intra & Extravascular):  -Strict I&Os   -Daily BMP, Mag & Phos, Replete electrolytes as indicated     GI:  Diet: Regular   Bowel Prophylaxis: miralax/yaya-colace     Infectious Disease:  Reactive leukocytosis (Down-trending)  -Monitor for SIRS  -Completed post-operative [id number] hours    Heme/Onc:  ABLA (expected)  -Monitor for s/s of anemia  -CBC daily, Transfuse for Hgb < 7     MSK:  -Padded pressure points    Ethics/Code Status:  Full Code     :  DVT Prophylaxis: Lovenox   Bowel Regimen: Miralax/yaya-colace   Diet: regular diet     Dispo: Maintain in SDU     This note was prepared using voice recognition software. The details of this note are correct and have been reviewed, and corrected to the best of my ability. Some grammatical areas may persist related to the Dragon software.     I have reviewed all medications, laboratory results, and imaging pertinent for today's encounter.  Plan Discussed with Dr. Landin   Thoracic surgery [facility] [place]         I spent 60 minutes in the professional and overall care of this patient.      Rachael Tsai, APRN-CNP, DNP

## 2025-02-05 NOTE — PROGRESS NOTES
[facility] Thoracic Surgery Progress Note       Date:  2/5/2025  Patient:  Gifty Shaw  YOB: 1960  MRN:  [id number]   Admit Date:  2/3/2025  Hospital Length of Stay: 2       History of Present Illness:  Gifty Shaw is a 64 y.o. year old female patient with Past Medical History of  hypertension and lung nodules s/p Right Thoracoscopy upper lobe wedge resection, mediastinal lymphadenectomy with Dr. Landin from 2/3/24.       Subjective   -Hemodynamically stable overnight, denies any new or concerning symptoms  -Reports improved sleep hygiene overnight   -CT with 75ml out overnight     Medical History:  Past Medical History:   Diagnosis Date    Eczema     Encounter for gynecological examination (general) (routine) without abnormal findings 05/10/2014    Encounter for gynecological examination without abnormal finding    Encounter for screening for malignant neoplasm of vagina     Vaginal Pap smear    Essential (primary) hypertension 08/01/2013    Benign essential hypertension    Other conditions influencing health status     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 90 tablet 3 2/3/2025 Morning    atorvastatin (Lipitor) 10 mg tablet Take 1 tablet (10 mg) by mouth once daily. 90 tablet 3 2/3/2025 Morning    escitalopram (Lexapro) 20 mg tablet Take 2 tablets (40 mg) by mouth once daily. 180 tablet 3 2/3/2025 Morning    estradiol (Estrace) 0.01 % (0.1  mg/gram) vaginal cream Insert 0.125 Applicatorfuls (0.5 g) into the vagina 3 times a week. Apply pea sized amount to vaginal opening every Monday, Wednesday, and Friday evening 42.5 g 3 Past Week    ketoconazole (NIZOral) 2 % cream Apply twice daily to affected areas of face 60 g 11 2/2/2025    traZODone (Desyrel) 100 mg tablet TAKE 2 TABLETS ONCE DAILY  AT BEDTIME 180 tablet 3 2/2/2025    tretinoin (Retin-A) 0.025 % cream Apply 1 Application topically once daily at bedtime. Start 1-2 nights per week 1-2 weeks, inc to every other night, then every night as tolerated 45 g 11 2/2/2025    triamterene-hydrochlorothiazid (Maxzide-25) 37.5-25 mg tablet Take 1 tablet by mouth once daily. 90 tablet 3 2/3/2025 Morning    zolpidem (Ambien) 10 mg tablet Take 1 tablet (10 mg) by mouth as needed at bedtime for sleep. 30 tablet 0 2/2/2025    nicotine (Nicoderm CQ) 7 mg/24 hr patch Place 1 patch on the skin once daily. (Patient not taking: Reported on 2/3/2025)   More than a month     Bupropion, Sulfa (sulfonamide antibiotics), and Sulfamethoxazole-trimethoprim  Social History     Tobacco Use    Smoking status: Every Day     Current packs/day: 0.50     Average packs/day: 0.5 packs/day for 40.1 years (20.0 ttl pk-yrs)     Types: Cigarettes     Start date: 1/7/1985    Smokeless tobacco: Never   Vaping Use    Vaping status: Never Used   Substance Use Topics    Alcohol use: Yes     Alcohol/week: 5.0 - 6.0 standard drinks of alcohol     Types: 5 - 6 Glasses of wine per week    Drug use: Yes     Types: Marijuana     Comment: gummies     Family History   Problem Relation Name Age of Onset    Other (cardiac disorder) Father      Colon cancer Other      Hypertension Other            Objective   Hospital Medications:    Current Facility-Administered Medications:     acetaminophen (Tylenol) tablet 650 mg, 650 mg, oral, q6h, Rocio GLASS Cap, APRN-CNP, 650 mg at 02/05/25 0612    amLODIPine (Norvasc) tablet 5 mg, 5 mg, oral, Daily, Rocio T Cap,  APRN-CNP, 5 mg at 02/04/25 0848    atorvastatin (Lipitor) tablet 10 mg, 10 mg, oral, Nightly, Rocio GLASS Cap, APRN-CNP, 10 mg at 02/04/25 2215    enoxaparin (Lovenox) syringe 40 mg, 40 mg, subcutaneous, q24h, Rocio GLASS Cap, APRN-CNP, 40 mg at 02/04/25 2217    escitalopram (Lexapro) tablet 40 mg, 40 mg, oral, Daily, Rocio GLASS Cap, APRN-CNP, 40 mg at 02/04/25 0848    lidocaine 4 % patch 1 patch, 1 patch, transdermal, Daily, Rocio GLASS Cap, APRN-CNP, 1 patch at 02/04/25 0847    methocarbamol (Robaxin) tablet 500 mg, 500 mg, oral, q8h BAILEY, Rocio GLASS Cap, APRN-CNP, 500 mg at 02/05/25 0612    naloxone (Narcan) injection 0.2 mg, 0.2 mg, intravenous, q5 min PRN, MARITZA Walker Cap-CNP    ondansetron ODT (Zofran-ODT) disintegrating tablet 4 mg, 4 mg, oral, q8h PRN **OR** ondansetron (Zofran) injection 4 mg, 4 mg, intravenous, q8h PRN, Rocio GLASS Cap, APRN-CNP    oxyCODONE (Roxicodone) immediate release tablet 10 mg, 10 mg, oral, q6h PRN, Rocio GLASS Cap, APRN-CNP, 10 mg at 02/05/25 0616    oxyCODONE (Roxicodone) immediate release tablet 5 mg, 5 mg, oral, q6h PRN, Rocio GLASS Cap, APRN-CNP, 5 mg at 02/04/25 1648    phenoL (Chloraseptic) 1.4 % mouth/throat spray 1 spray, 1 spray, Mouth/Throat, q2 min PRN, Rocio GLASS Cap, APRN-CNP    polyethylene glycol (Glycolax, Miralax) packet 17 g, 17 g, oral, Daily, Rocio GLASS Cap, APRN-CNP, 17 g at 02/04/25 0848    sennosides-docusate sodium (Medina-Colace) 8.6-50 mg per tablet 2 tablet, 2 tablet, oral, BID, Rocio T Cap, APRN-CNP, 2 tablet at 02/03/25 2223    traZODone (Desyrel) tablet 200 mg, 200 mg, oral, Nightly, Rocio T Cap, APRN-CNP, 200 mg at 02/04/25 2215    triamterene-hydrochlorothiazid (Maxzide-25) 37.5-25 mg per tablet 1 tablet, 1 tablet, oral, Daily, Rocio T Charly, APRN-CNP, 1 tablet at 02/04/25 0848    Review of Systems:  14 point review of systems was completed and negative except for those specially mention in my HPI       Objective     Physical Exam:    Heart Rate:  [81]   Temp:   [36.4 °C (97.5 °F)-37.1 °C (98.8 °F)]   Resp:  [17-18]   BP: ()/(58-70)   SpO2:  [92 %-99 %]     Physical Exam  Constitutional:       Appearance: Normal appearance.   HENT:      Mouth/Throat:      Mouth: Mucous membranes are moist.   Eyes:      Extraocular Movements: Extraocular movements intact.      Conjunctiva/sclera: Conjunctivae normal.   Cardiovascular:      Rate and Rhythm: Normal rate and regular rhythm.   Pulmonary:      Effort: Pulmonary effort is normal.      Comments: Diminished throughout   Right chest tube to atrium on waterseal   -Minimal serosanguinous output  -+Airleak - continuously   Abdominal:      General: Bowel sounds are normal.      Palpations: Abdomen is soft.   Skin:     General: Skin is warm and dry.      Capillary Refill: Capillary refill takes less than 2 seconds.      Comments: Chest tube dressing c/d/I   No crepitus    Neurological:      General: No focal deficit present.      Mental Status: She is alert and oriented to person, place, and time.   Psychiatric:         Mood and Affect: Mood normal.         Objective:    I have reviewed all medications, laboratory results, and imaging pertinent for today's encounter.           Intake/Output Summary (Last 24 hours) at 2/5/2025 0814  Last data filed at 2/5/2025 0709  Gross per 24 hour   Intake 50 ml   Output 360 ml   Net -310 ml       Daily Labs:  CBC:   Results from last 7 days   Lab Units 02/05/25  0540 02/04/25  0504   WBC AUTO x10*3/uL 11.9* 14.6*   HEMOGLOBIN g/dL 10.3* 11.3*   HEMATOCRIT % 31.0* 35.1*   MCV fL 100 101*     BMP:    Results from last 7 days   Lab Units 02/05/25  0540 02/04/25  0504   SODIUM mmol/L 138 137   POTASSIUM mmol/L 3.9 3.2*   CHLORIDE mmol/L 104 101   CO2 mmol/L 30 29   BUN mg/dL 12 10   CREATININE mg/dL 0.63 0.69   CALCIUM mg/dL 8.5* 8.7   GLUCOSE mg/dL 107* 98   MAGNESIUM mg/dL 1.97 1.76       Assessment/Plan:    I am currently managing this critically ill patient for the following  problems:    Neuro/Psych/Pain Ctrl/Sedation:  -Pain Control: Schedule tylenol, lidocaine patch and robaxin/PRN oxycodone   -Promote Sleep/wake hygiene  -Serial pain assessments   -Trazodone   -Continue lexapro    Cardiovascular:  HTN  -Maintain MAP > 65   -Continuous Cardiac Monitoring   -Continue triamterene/hydrochlorothiazide and amlodipine     Respiratory/ENT:  Lung nodule s/p lung nodules s/p Right Thoracoscopy upper lobe wedge resection, mediastinal lymphadenectomy  CXR this morning showed a stable moderate right apical pneumothorax with chest tube in place. Patient with ongoing airleak this morning   -Maintain SPO2 > 92%  -Promote Pulm Hygiene Daily   -Monitor CT output   -CT to waterseal, due to airleak will maintain today   -Daily CXR while CT in place   -Encourage frequent ambulation   -IS     Renal/Volume Status (Intra & Extravascular):  -Strict I&Os   -Daily BMP, Mag & Phos, Replete electrolytes as indicated     GI:  Diet: Regular   Bowel Prophylaxis: miralax/yaya-colace     Infectious Disease:  Reactive leukocytosis (Down-trending)  -Monitor for SIRS  -Completed post-operative [id number] hours    Heme/Onc:  ABLA (expected)  -Monitor for s/s of anemia  -CBC daily, Transfuse for Hgb < 7     MSK:  -Padded pressure points    Ethics/Code Status:  Full Code     :  DVT Prophylaxis: Lovenox   Bowel Regimen: Miralax/yaya-colace   Diet: regular diet     Dispo: Maintain in SDU     This note was prepared using voice recognition software. The details of this note are correct and have been reviewed, and corrected to the best of my ability. Some grammatical areas may persist related to the Dragon software.     I have reviewed all medications, laboratory results, and imaging pertinent for today's encounter.  Plan Discussed with Dr. Landin   Thoracic surgery [facility]         I spent 60 minutes in the professional and overall care of this patient.      Rachael Tsai, APRN-CNP, DNP

## 2025-02-05 NOTE — PROGRESS NOTES
02/05/25 1239   Discharge Planning   Expected Discharge Disposition Home        Patient remains in SDU. She still have air leak. CT decided to keep chest tube to right side one more day. Seen patient ambulating in hallways. When patient is medically ready will go home with her , no needs identified.

## 2025-02-05 NOTE — CARE PLAN
The patient's goals for the shift include      The clinical goals for the shift include pt will remain hemodynamically stable      Problem: Pain - Adult  Goal: Verbalizes/displays adequate comfort level or baseline comfort level  Outcome: Progressing     Problem: Safety - Adult  Goal: Free from fall injury  Outcome: Progressing     Problem: Discharge Planning  Goal: Discharge to home or other facility with appropriate resources  Outcome: Progressing

## 2025-02-06 ENCOUNTER — APPOINTMENT (OUTPATIENT)
Dept: RADIOLOGY | Facility: HOSPITAL | Age: 65
End: 2025-02-06
Payer: COMMERCIAL

## 2025-02-06 VITALS
OXYGEN SATURATION: 95 % | SYSTOLIC BLOOD PRESSURE: 109 MMHG | TEMPERATURE: 97.5 F | RESPIRATION RATE: 20 BRPM | WEIGHT: 99.43 LBS | DIASTOLIC BLOOD PRESSURE: 70 MMHG | BODY MASS INDEX: 18.77 KG/M2 | HEART RATE: 73 BPM | HEIGHT: 61 IN

## 2025-02-06 LAB
ANION GAP SERPL CALC-SCNC: 8 MMOL/L (ref 10–20)
BUN SERPL-MCNC: 10 MG/DL (ref 6–23)
CALCIUM SERPL-MCNC: 9.2 MG/DL (ref 8.6–10.3)
CHLORIDE SERPL-SCNC: 103 MMOL/L (ref 98–107)
CO2 SERPL-SCNC: 31 MMOL/L (ref 21–32)
CREAT SERPL-MCNC: 0.58 MG/DL (ref 0.5–1.05)
EGFRCR SERPLBLD CKD-EPI 2021: >90 ML/MIN/1.73M*2
ERYTHROCYTE [DISTWIDTH] IN BLOOD BY AUTOMATED COUNT: 12.4 % (ref 11.5–14.5)
GLUCOSE SERPL-MCNC: 115 MG/DL (ref 74–99)
HCT VFR BLD AUTO: 30.9 % (ref 36–46)
HGB BLD-MCNC: 10.3 G/DL (ref 12–16)
MAGNESIUM SERPL-MCNC: 1.87 MG/DL (ref 1.6–2.4)
MCH RBC QN AUTO: 33.2 PG (ref 26–34)
MCHC RBC AUTO-ENTMCNC: 33.3 G/DL (ref 32–36)
MCV RBC AUTO: 100 FL (ref 80–100)
NRBC BLD-RTO: 0 /100 WBCS (ref 0–0)
PLATELET # BLD AUTO: 229 X10*3/UL (ref 150–450)
POTASSIUM SERPL-SCNC: 3.8 MMOL/L (ref 3.5–5.3)
RBC # BLD AUTO: 3.1 X10*6/UL (ref 4–5.2)
SODIUM SERPL-SCNC: 138 MMOL/L (ref 136–145)
WBC # BLD AUTO: 10 X10*3/UL (ref 4.4–11.3)

## 2025-02-06 PROCEDURE — 2500000004 HC RX 250 GENERAL PHARMACY W/ HCPCS (ALT 636 FOR OP/ED): Performed by: NURSE PRACTITIONER

## 2025-02-06 PROCEDURE — 71045 X-RAY EXAM CHEST 1 VIEW: CPT

## 2025-02-06 PROCEDURE — 2500000005 HC RX 250 GENERAL PHARMACY W/O HCPCS: Performed by: NURSE PRACTITIONER

## 2025-02-06 PROCEDURE — 71045 X-RAY EXAM CHEST 1 VIEW: CPT | Performed by: STUDENT IN AN ORGANIZED HEALTH CARE EDUCATION/TRAINING PROGRAM

## 2025-02-06 PROCEDURE — 2500000002 HC RX 250 W HCPCS SELF ADMINISTERED DRUGS (ALT 637 FOR MEDICARE OP, ALT 636 FOR OP/ED): Performed by: NURSE PRACTITIONER

## 2025-02-06 PROCEDURE — 85027 COMPLETE CBC AUTOMATED: CPT | Performed by: NURSE PRACTITIONER

## 2025-02-06 PROCEDURE — 99233 SBSQ HOSP IP/OBS HIGH 50: CPT | Performed by: NURSE PRACTITIONER

## 2025-02-06 PROCEDURE — 2500000001 HC RX 250 WO HCPCS SELF ADMINISTERED DRUGS (ALT 637 FOR MEDICARE OP): Performed by: NURSE PRACTITIONER

## 2025-02-06 PROCEDURE — 83735 ASSAY OF MAGNESIUM: CPT | Performed by: NURSE PRACTITIONER

## 2025-02-06 PROCEDURE — 36415 COLL VENOUS BLD VENIPUNCTURE: CPT | Performed by: NURSE PRACTITIONER

## 2025-02-06 PROCEDURE — 80048 BASIC METABOLIC PNL TOTAL CA: CPT | Performed by: NURSE PRACTITIONER

## 2025-02-06 RX ORDER — POTASSIUM CHLORIDE 20 MEQ/1
40 TABLET, EXTENDED RELEASE ORAL ONCE
Status: COMPLETED | OUTPATIENT
Start: 2025-02-06 | End: 2025-02-06

## 2025-02-06 RX ORDER — OXYCODONE HYDROCHLORIDE 5 MG/1
5 TABLET ORAL EVERY 6 HOURS PRN
Qty: 15 TABLET | Refills: 0 | Status: SHIPPED | OUTPATIENT
Start: 2025-02-06 | End: 2025-02-11

## 2025-02-06 RX ORDER — MAGNESIUM SULFATE HEPTAHYDRATE 40 MG/ML
2 INJECTION, SOLUTION INTRAVENOUS ONCE
Status: COMPLETED | OUTPATIENT
Start: 2025-02-06 | End: 2025-02-06

## 2025-02-06 RX ADMIN — MAGNESIUM SULFATE HEPTAHYDRATE 2 G: 40 INJECTION, SOLUTION INTRAVENOUS at 09:56

## 2025-02-06 RX ADMIN — METHOCARBAMOL TABLETS 500 MG: 500 TABLET, COATED ORAL at 05:24

## 2025-02-06 RX ADMIN — POTASSIUM CHLORIDE 40 MEQ: 1500 TABLET, EXTENDED RELEASE ORAL at 09:55

## 2025-02-06 RX ADMIN — METHOCARBAMOL TABLETS 500 MG: 500 TABLET, COATED ORAL at 13:20

## 2025-02-06 RX ADMIN — SENNOSIDES AND DOCUSATE SODIUM 2 TABLET: 50; 8.6 TABLET ORAL at 08:57

## 2025-02-06 RX ADMIN — TRIAMTERENE AND HYDROCHLOROTHIAZIDE 1 TABLET: 37.5; 25 TABLET ORAL at 08:57

## 2025-02-06 RX ADMIN — OXYCODONE HYDROCHLORIDE 5 MG: 5 TABLET ORAL at 09:04

## 2025-02-06 RX ADMIN — ACETAMINOPHEN 650 MG: 325 TABLET, FILM COATED ORAL at 11:37

## 2025-02-06 RX ADMIN — AMLODIPINE BESYLATE 5 MG: 5 TABLET ORAL at 08:57

## 2025-02-06 RX ADMIN — ESCITALOPRAM OXALATE 40 MG: 20 TABLET ORAL at 08:57

## 2025-02-06 RX ADMIN — LIDOCAINE 4% 1 PATCH: 40 PATCH TOPICAL at 08:57

## 2025-02-06 RX ADMIN — ACETAMINOPHEN 650 MG: 325 TABLET, FILM COATED ORAL at 05:24

## 2025-02-06 ASSESSMENT — PAIN DESCRIPTION - LOCATION
LOCATION: RIB CAGE
LOCATION: RIB CAGE

## 2025-02-06 ASSESSMENT — PAIN - FUNCTIONAL ASSESSMENT
PAIN_FUNCTIONAL_ASSESSMENT: 0-10

## 2025-02-06 ASSESSMENT — PAIN SCALES - GENERAL
PAINLEVEL_OUTOF10: 3
PAINLEVEL_OUTOF10: 0 - NO PAIN
PAINLEVEL_OUTOF10: 1
PAINLEVEL_OUTOF10: 4
PAINLEVEL_OUTOF10: 0 - NO PAIN

## 2025-02-06 ASSESSMENT — PAIN DESCRIPTION - ORIENTATION
ORIENTATION: RIGHT
ORIENTATION: RIGHT

## 2025-02-06 NOTE — PROGRESS NOTES
02/06/25 1055   Discharge Planning   Expected Discharge Disposition Home       Patient still have right chest tube to water seal. Waiting on reading of chest xray and decision if chest tube will be discontinued today. When medically ready patient will go home with no needs.

## 2025-02-06 NOTE — DISCHARGE SUMMARY
Discharge Diagnosis  Lung nodules    Issues Requiring Follow-Up  None    Test Results Pending At Discharge  Pending Labs       Order Current Status    Surgical Pathology Exam In process            Hospital Course    Neuro/Psych/Pain Ctrl/Sedation:  -Pain Control: Schedule tylenol, lidocaine patch and robaxin/PRN oxycodone   -Promote Sleep/wake hygiene  -Serial pain assessments   -Trazodone   -Continue lexapro     Cardiovascular:  HTN  -Maintain MAP > 65   -Continuous Cardiac Monitoring   -Continue triamterene/hydrochlorothiazide and amlodipine      Respiratory/ENT:  Lung nodule s/p lung nodules s/p Right Thoracoscopy upper lobe wedge resection, mediastinal lymphadenectomy  CXR this morning showed stable to improved pneumothorax on right side. CT to water seal, small intermittent 1 chamber air leak with cough or valsalva, no air leak noted with normal breathing or talking.   -Maintain SPO2 > 92%  -Promote Pulm Hygiene Daily   - DC chest tube today, reviewed with Dr. Landin  - Encourage frequent ambulation   - IS   - Chest xray 2 hours post pull if ok and stable will discharge home     Renal/Volume Status (Intra & Extravascular):  -Strict I&Os   -Daily BMP, Mag & Phos, Replete electrolytes as indicated      GI:  Diet: Regular   Bowel Prophylaxis: miralax/yaya-colace      Infectious Disease:  Reactive leukocytosis (Down-trending)  -Monitor for SIRS  -Completed post-operative [id number] hours     Heme/Onc:  ABLA (expected)  -Monitor for s/s of anemia  -CBC daily, Transfuse for Hgb < 7      MSK:  -Padded pressure points     Ethics/Code Status:  Full Code      :  DVT Prophylaxis: Lovenox   Bowel Regimen: Miralax/yaya-colace   Diet: regular diet      Dispo: following post chest tube pull if stable will DC home     Discussed with Dr. Landin     2735 - Chest tube clamped, chest xray post clamping stable, site cleansed with CHG and suture cut.  Chest tube pulled with out difficulty, patient tolerated well,  covered with occlusive tegaderm.  Chest xray to follow 2 hours post pull.      1553 - post CT pull chest xray c stable pneumothorax, reviewed with Dr. Landin, will discharge home with appointment in two weeks with chest xray.          Home Medications     Medication List      CONTINUE taking these medications     amLODIPine 5 mg tablet; Commonly known as: Norvasc; Take 1 tablet (5 mg)   by mouth once daily.   atorvastatin 10 mg tablet; Commonly known as: Lipitor; Take 1 tablet (10   mg) by mouth once daily.   escitalopram 20 mg tablet; Commonly known as: Lexapro; Take 2 tablets   (40 mg) by mouth once daily.   estradiol 0.01 % (0.1 mg/gram) vaginal cream; Commonly known as:   Estrace; Insert 0.125 Applicatorfuls (0.5 g) into the vagina 3 times a   week. Apply pea sized amount to vaginal opening every Monday, Wednesday,   and Friday evening   ketoconazole 2 % cream; Commonly known as: NIZOral; Apply twice daily to   affected areas of face   traZODone 100 mg tablet; Commonly known as: Desyrel; TAKE 2 TABLETS ONCE   DAILY  AT BEDTIME   tretinoin 0.025 % cream; Commonly known as: Retin-A; Apply 1 Application   topically once daily at bedtime. Start 1-2 nights per week 1-2 weeks, inc   to every other night, then every night as tolerated   triamterene-hydrochlorothiazid 37.5-25 mg tablet; Commonly known as:   Maxzide-25; Take 1 tablet by mouth once daily.   zolpidem 10 mg tablet; Commonly known as: Ambien; Take 1 tablet (10 mg)   by mouth as needed at bedtime for sleep.     STOP taking these medications     nicotine 7 mg/24 hr patch; Commonly known as: Nicoderm CQ       Outpatient Follow-Up  Future Appointments   Date Time Provider Department Center   2/18/2025 10:30 AM Marlena Landin DO [id number] [facility]   3/4/2025 10:15 AM Rocio Sandoval MD [id number] None   7/15/2025 10:20 AM Shira Wilson MD [id number] [facility]   10/10/2025  9:30 AM Kenneth Lott MD [id number] [facility]       Joshua Sandoval  APRN-CNP

## 2025-02-06 NOTE — CARE PLAN
The patient's goals for the shift include      The clinical goals for the shift include Pt will remain HDS and have pain control      Problem: Pain - Adult  Goal: Verbalizes/displays adequate comfort level or baseline comfort level  Outcome: Progressing     Problem: Safety - Adult  Goal: Free from fall injury  Outcome: Progressing     Problem: Discharge Planning  Goal: Discharge to home or other facility with appropriate resources  Outcome: Progressing     Problem: Chronic Conditions and Co-morbidities  Goal: Patient's chronic conditions and co-morbidity symptoms are monitored and maintained or improved  Outcome: Progressing     Problem: Nutrition  Goal: Nutrient intake appropriate for maintaining nutritional needs  Outcome: Progressing     Problem: Respiratory  Goal: Clear secretions with interventions this shift  Outcome: Progressing  Goal: Minimize anxiety/maximize coping throughout shift  Outcome: Progressing  Goal: Minimal/no exertional discomfort or dyspnea this shift  Outcome: Progressing  Goal: No signs of respiratory distress (eg. Use of accessory muscles. Peds grunting)  Outcome: Progressing  Goal: Patent airway maintained this shift  Outcome: Progressing  Goal: Tolerate mechanical ventilation evidenced by VS/agitation level this shift  Outcome: Progressing  Goal: Tolerate pulmonary toileting this shift  Outcome: Progressing  Goal: Verbalize decreased shortness of breath this shift  Outcome: Progressing  Goal: Wean oxygen to maintain O2 saturation per order/standard this shift  Outcome: Progressing  Goal: Increase self care and/or family involvement in next 24 hours  Outcome: Progressing

## 2025-02-06 NOTE — PROGRESS NOTES
"Gifty Shaw is a 64 y.o. female on day 3 of admission presenting with Lung nodules.    Subjective   GISELA reported.  Patient resting in bed on RA with chest tube to water seal and in no acute distress  CT site s drainage or bleeding       Objective     Physical Exam  Constitutional:       General: She is not in acute distress.  Cardiovascular:      Rate and Rhythm: Normal rate and regular rhythm.      Pulses: Normal pulses.   Pulmonary:      Effort: Pulmonary effort is normal.      Breath sounds: Normal breath sounds.   Abdominal:      General: There is no distension.      Palpations: Abdomen is soft.      Tenderness: There is no abdominal tenderness.   Musculoskeletal:         General: Normal range of motion.   Skin:     General: Skin is warm and dry.      Capillary Refill: Capillary refill takes less than 2 seconds.      Comments: Right CT site without drainage or crepitus.   CT to water seal, small intermittent 1 chamber air leak with cough or valsalva, no air leak noted with normal breathing or talking.    Neurological:      Mental Status: She is alert and oriented to person, place, and time. Mental status is at baseline.   Psychiatric:         Mood and Affect: Mood normal.         Behavior: Behavior normal.         Last Recorded Vitals  Blood pressure 100/62, pulse 66, temperature 36.2 °C (97.1 °F), temperature source Temporal, resp. rate 19, height 1.549 m (5' 1\"), weight 45.1 kg (99 lb 6.8 oz), last menstrual period 01/01/2004, SpO2 92%.  Intake/Output last 3 Shifts:  I/O last 3 completed shifts:  In: - (0 mL/kg)   Out: 950 (21.1 mL/kg) [Urine:800 (0.5 mL/kg/hr); Chest Tube:150]  Weight: 45.1 kg     Relevant Results  Scheduled medications  acetaminophen, 650 mg, oral, q6h  amLODIPine, 5 mg, oral, Daily  atorvastatin, 10 mg, oral, Nightly  enoxaparin, 40 mg, subcutaneous, q24h  escitalopram, 40 mg, oral, Daily  lidocaine, 1 patch, transdermal, Daily  methocarbamol, 500 mg, oral, q8h BAILEY  polyethylene " glycol, 17 g, oral, Daily  sennosides-docusate sodium, 2 tablet, oral, BID  traZODone, 200 mg, oral, Nightly  triamterene-hydrochlorothiazid, 1 tablet, oral, Daily      Continuous medications     PRN medications  PRN medications: naloxone, ondansetron ODT **OR** ondansetron, oxyCODONE, oxyCODONE, phenoL    Results for orders placed or performed during the hospital encounter of 02/03/25 (from the past 24 hours)   CBC   Result Value Ref Range    WBC 10.0 4.4 - 11.3 x10*3/uL    nRBC 0.0 0.0 - 0.0 /100 WBCs    RBC 3.10 (L) 4.00 - 5.20 x10*6/uL    Hemoglobin 10.3 (L) 12.0 - 16.0 g/dL    Hematocrit 30.9 (L) 36.0 - 46.0 %     80 - 100 fL    MCH 33.2 26.0 - 34.0 pg    MCHC 33.3 32.0 - 36.0 g/dL    RDW 12.4 11.5 - 14.5 %    Platelets 229 150 - 450 x10*3/uL   Basic metabolic panel   Result Value Ref Range    Glucose 115 (H) 74 - 99 mg/dL    Sodium 138 136 - 145 mmol/L    Potassium 3.8 3.5 - 5.3 mmol/L    Chloride 103 98 - 107 mmol/L    Bicarbonate 31 21 - 32 mmol/L    Anion Gap 8 (L) 10 - 20 mmol/L    Urea Nitrogen 10 6 - 23 mg/dL    Creatinine 0.58 0.50 - 1.05 mg/dL    eGFR >90 >60 mL/min/1.73m*2    Calcium 9.2 8.6 - 10.3 mg/dL   Magnesium   Result Value Ref Range    Magnesium 1.87 1.60 - 2.40 mg/dL     XR chest 1 view    Result Date: 2/6/2025  Interpreted By:  Lon Glover, STUDY: XR CHEST 1 VIEW; 2/6/2025 5:15 am   INDICATION: Signs/Symptoms:s/p VATs   COMPARISON: Radiographs 02/05/2025   ACCESSION NUMBER(S): [id number]   ORDERING CLINICIAN: WES ACHARYA   TECHNIQUE: Single frontal view of the chest performed.   FINDINGS: LINES AND DEVICES: None.   LUNGS: Previously shown moderate right apical pneumothorax has decreased in size now with a pleural-parenchymal separation measuring 2.1 cm, previously 3.6 cm. Postsurgical changes in the right lung again noted. No new focal consolidation. Stable trace left-sided pleural effusion. Small right lower lung calcified granuloma.   CARDIOMEDIASTINAL SILHOUETTE: The  cardiomediastinal silhouette is within normal limits.       Decreased size now small right apical pneumothorax.   MACRO None   Signed by: Lon Glover 2/6/2025 8:28 AM Dictation workstation:   [id number]    XR chest 1 view    Result Date: 2/5/2025  Interpreted By:  Tamia Malik, STUDY: XR CHEST 1 VIEW;  2/5/2025 5:06 am   INDICATION: Signs/Symptoms:small right pneumothorax.     COMPARISON: 02/04/2025 at 5:00 a.m.   ACCESSION NUMBER(S): [id number]   ORDERING CLINICIAN: WES ACHARYA   FINDINGS: Artifact from overlying monitoring leads again noted. Right apical chest tube remains in place with stable right upper chest surgical material, moderate pneumothorax and nodular suprahilar opacity. Stable small dense nodule in the lateral right lung base. Hazy left basilar opacity and blunting of the left costophrenic angle is also unchanged. Cardiac silhouette within normal limits for size.       Stable moderate right apical pneumothorax with chest tube in place. No significant change from previous day's exam.   MACRO: None.   Signed by: Tamia Malik 2/5/2025 8:05 AM Dictation workstation:   [id number]       Assessment/Plan   Assessment & Plan  Lung nodules    HTN (hypertension)    Eczema    Neuro/Psych/Pain Ctrl/Sedation:  -Pain Control: Schedule tylenol, lidocaine patch and robaxin/PRN oxycodone   -Promote Sleep/wake hygiene  -Serial pain assessments   -Trazodone   -Continue lexapro     Cardiovascular:  HTN  -Maintain MAP > 65   -Continuous Cardiac Monitoring   -Continue triamterene/hydrochlorothiazide and amlodipine      Respiratory/ENT:  Lung nodule s/p lung nodules s/p Right Thoracoscopy upper lobe wedge resection, mediastinal lymphadenectomy  CXR this morning showed stable to improved pneumothorax on right side. CT to water seal, small intermittent 1 chamber air leak with cough or valsalva, no air leak noted with normal breathing or talking.   -Maintain SPO2 > 92%  -Promote Pulm Hygiene Daily   - DC chest tube  today, reviewed with Dr. Landin  - Encourage frequent ambulation   - IS   - Chest xray 2 hours post pull if ok and stable will discharge home     Renal/Volume Status (Intra & Extravascular):  -Strict I&Os   -Daily BMP, Mag & Phos, Replete electrolytes as indicated      GI:  Diet: Regular   Bowel Prophylaxis: miralax/yaya-colace      Infectious Disease:  Reactive leukocytosis (Down-trending)  -Monitor for SIRS  -Completed post-operative [id number] hours     Heme/Onc:  ABLA (expected)  -Monitor for s/s of anemia  -CBC daily, Transfuse for Hgb < 7      MSK:  -Padded pressure points     Ethics/Code Status:  Full Code      :  DVT Prophylaxis: Lovenox   Bowel Regimen: Miralax/yaya-colace   Diet: regular diet      Dispo: following post chest tube pull if stable will DC home    Discussed with Dr. Landin    1230 - Chest tube clamped, chest xray post clamping stable, site cleansed with CHG and suture cut.  Chest tube pulled with out difficulty, patient tolerated well, covered with occlusive tegaderm.  Chest xray to follow 2 hours post pull.     1553 - post CT pull chest xray c stable pneumothorax, reviewed with Dr. Landin, will discharge home with appointment in two weeks with chest xray.    I spent 45 minutes in the professional and overall care of this patient.      MARITZA Patiño-CNP

## 2025-02-06 NOTE — CARE PLAN
Problem: Pain - Adult  Goal: Verbalizes/displays adequate comfort level or baseline comfort level  Outcome: Progressing     Problem: Safety - Adult  Goal: Free from fall injury  Outcome: Progressing     Problem: Discharge Planning  Goal: Discharge to home or other facility with appropriate resources  Outcome: Progressing     Problem: Chronic Conditions and Co-morbidities  Goal: Patient's chronic conditions and co-morbidity symptoms are monitored and maintained or improved  Outcome: Progressing   The patient's goals for the shift include      The clinical goals for the shift include patients pain will be controlled for the duration of the shift

## 2025-02-18 ENCOUNTER — OFFICE VISIT (OUTPATIENT)
Dept: SURGERY | Facility: CLINIC | Age: 65
End: 2025-02-18
Payer: COMMERCIAL

## 2025-02-18 ENCOUNTER — HOSPITAL ENCOUNTER (OUTPATIENT)
Dept: RADIOLOGY | Facility: CLINIC | Age: 65
Discharge: HOME | End: 2025-02-18
Payer: COMMERCIAL

## 2025-02-18 VITALS
DIASTOLIC BLOOD PRESSURE: 81 MMHG | HEART RATE: 86 BPM | TEMPERATURE: 97.7 F | WEIGHT: 100 LBS | OXYGEN SATURATION: 95 % | HEIGHT: 61 IN | BODY MASS INDEX: 18.88 KG/M2 | SYSTOLIC BLOOD PRESSURE: 153 MMHG

## 2025-02-18 DIAGNOSIS — R91.8 LUNG NODULES: ICD-10-CM

## 2025-02-18 DIAGNOSIS — C34.11 MALIGNANT NEOPLASM OF UPPER LOBE OF RIGHT LUNG (MULTI): ICD-10-CM

## 2025-02-18 LAB
LAB AP ASR DISCLAIMER: NORMAL
LAB AP BLOCK FOR ADDITIONAL STUDIES: NORMAL
LABORATORY COMMENT REPORT: NORMAL
Lab: NORMAL
PATH REPORT.COMMENTS IMP SPEC: NORMAL
PATH REPORT.FINAL DX SPEC: NORMAL
PATH REPORT.GROSS SPEC: NORMAL
PATH REPORT.RELEVANT HX SPEC: NORMAL
PATH REPORT.TOTAL CANCER: NORMAL
PATHOLOGY SYNOPTIC REPORT: NORMAL

## 2025-02-18 PROCEDURE — 4004F PT TOBACCO SCREEN RCVD TLK: CPT | Performed by: STUDENT IN AN ORGANIZED HEALTH CARE EDUCATION/TRAINING PROGRAM

## 2025-02-18 PROCEDURE — 3079F DIAST BP 80-89 MM HG: CPT | Performed by: STUDENT IN AN ORGANIZED HEALTH CARE EDUCATION/TRAINING PROGRAM

## 2025-02-18 PROCEDURE — 71046 X-RAY EXAM CHEST 2 VIEWS: CPT

## 2025-02-18 PROCEDURE — 99211 OFF/OP EST MAY X REQ PHY/QHP: CPT | Mod: 24,25 | Performed by: STUDENT IN AN ORGANIZED HEALTH CARE EDUCATION/TRAINING PROGRAM

## 2025-02-18 PROCEDURE — 3077F SYST BP >= 140 MM HG: CPT | Performed by: STUDENT IN AN ORGANIZED HEALTH CARE EDUCATION/TRAINING PROGRAM

## 2025-02-18 PROCEDURE — 71046 X-RAY EXAM CHEST 2 VIEWS: CPT | Performed by: RADIOLOGY

## 2025-02-18 PROCEDURE — 3008F BODY MASS INDEX DOCD: CPT | Performed by: STUDENT IN AN ORGANIZED HEALTH CARE EDUCATION/TRAINING PROGRAM

## 2025-02-18 RX ORDER — CAL/D3/MAG11/ZINC/COP/MANG/BOR 600 MG-800
TABLET ORAL
COMMUNITY

## 2025-02-18 ASSESSMENT — PAIN SCALES - GENERAL: PAINLEVEL_OUTOF10: 0-NO PAIN

## 2025-02-18 NOTE — DOCUMENTATION CLARIFICATION NOTE
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    CDI QUERY TEXT:    Clarification      Instruction:    Based on your assessment of the patient and the clinical information, please provide the requested documentation by clicking on the appropriate radio button and enter any additional information if prompted.    Question: Please document whether you concur or do not concur with the 2/3/25 RUL lung wedge resection surgical pathology report findings    When answering this query, please exercise your independent professional judgment. The fact that a question is being asked, does not imply that any particular answer is desired or expected.    The patient's clinical indicators include:  Clinical Information: 64 yr old woman with RUL pulmonary nodule. Pt had RUL wedge resection on 2/3/25.    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  -- I do not concur with the 2/3/25 RUL lung wedge resection pathology report findings.  -- Other - I will add my own diagnosis  -- Refer to Clinical Documentation Reviewer    Query created by: Leila Moore on 2025 2:50 PM      Electronically " signed by:  ROSELINE KERR DO 2/18/2025 3:02 PM

## 2025-02-18 NOTE — PROGRESS NOTES
Chief complaint:  Post-op visit    History Of Present Illness  Gifty Shaw is a 64 y.o. female, current smoker (50 years, approx 1/2 ppd - 25 pack year hx) here for her first post-op visit with a RUL nodule now s/p Rt VATS upper lobe wedge + MLND for a pT1bN0 acinar adenocarcinoma on 2/3/24. She did well overall, small post-op air leak and was discharged on POD #3.     Since then has been doing well. Mild pain and also numbness along her right side. States her energy level is actually improved. She has no significant SOB, no cough. Has abstained from smoking since hospital discharge.     By way of review: I saw patient in Jan 2025 with a right apical spiculated subsolid lesion. Patient was referred by Dr. Colon, PCP. Patient underwent LDCT in Dec 2024, this showed bilateral apical scarring vs lesions but with a prominent right apical subsolid 1.6cm lesion concerning for malignancy/adenocarcinoma spectral lesion. She denies any new cough, congestion, SOB, or unexplained weight loss. Has unfortunately had diarrhea a couple weeks but no other physical complaints.     Retired .     No history of MI or CVA. Could walk a mile or climb 2 flights of stairs: yes     Past Medical History  She has a past medical history of Eczema, Encounter for gynecological examination (general) (routine) without abnormal findings (05/10/2014), Encounter for screening for malignant neoplasm of vagina, Essential (primary) hypertension (08/01/2013), Other conditions influencing health status, Personal history of other endocrine, nutritional and metabolic disease, Personal history of other medical treatment, and Personal history of other mental and behavioral disorders (08/20/2013).    Surgical History  She has a past surgical history that includes Breast biopsy (Bilateral); Colonoscopy; and Lung removal, partial (Right, 02/03/2025).     Social History  She reports that she has been smoking cigarettes. She started smoking  about 40 years ago. She has a 20.1 pack-year smoking history. She has never used smokeless tobacco. She reports current alcohol use of about 5.0 - 6.0 standard drinks of alcohol per week. She reports current drug use. Drug: Marijuana.    Family History  Family history of cancer: No  Family History   Problem Relation Name Age of Onset    Other (cardiac disorder) Father      Colon cancer Other      Hypertension Other          Medications    Current Outpatient Medications:     amLODIPine (Norvasc) 5 mg tablet, Take 1 tablet (5 mg) by mouth once daily., Disp: 90 tablet, Rfl: 3    atorvastatin (Lipitor) 10 mg tablet, Take 1 tablet (10 mg) by mouth once daily., Disp: 90 tablet, Rfl: 3    escitalopram (Lexapro) 20 mg tablet, Take 2 tablets (40 mg) by mouth once daily., Disp: 180 tablet, Rfl: 3    estradiol (Estrace) 0.01 % (0.1 mg/gram) vaginal cream, Insert 0.125 Applicatorfuls (0.5 g) into the vagina 3 times a week. Apply pea sized amount to vaginal opening every Monday, Wednesday, and Friday evening, Disp: 42.5 g, Rfl: 3    ketoconazole (NIZOral) 2 % cream, Apply twice daily to affected areas of face, Disp: 60 g, Rfl: 11    traZODone (Desyrel) 100 mg tablet, TAKE 2 TABLETS ONCE DAILY  AT BEDTIME, Disp: 180 tablet, Rfl: 3    tretinoin (Retin-A) 0.025 % cream, Apply 1 Application topically once daily at bedtime. Start 1-2 nights per week 1-2 weeks, inc to every other night, then every night as tolerated, Disp: 45 g, Rfl: 11    triamterene-hydrochlorothiazid (Maxzide-25) 37.5-25 mg tablet, Take 1 tablet by mouth once daily., Disp: 90 tablet, Rfl: 3    zolpidem (Ambien) 10 mg tablet, Take 1 tablet (10 mg) by mouth as needed at bedtime for sleep., Disp: 30 tablet, Rfl: 0    Allergies  Bupropion, Sulfa (sulfonamide antibiotics), and Sulfamethoxazole-trimethoprim    Review of Systems:  Review of Systems   Constitutional: No fevers, chills, unexpected weight change  HENT: No sore throat, congestion, or nasal drainage  Eyes: No  "visual changes or eye itching  Respiratory: see HPI. No cough, worsening dyspnea, wheezing  Cardiac: No chest pain, palpitations, or lower extremity edema  Gastrointestinal: No nausea, vomiting, diarrhea. No abdominal pain  Genitourinary: No dysuria or hematuria  Musculoskeletal: No back pain. No significant myalgias or arthralgias  Neurologic: No headaches, dizziness, or seizures.  Hematologic: No easy bleeding or bruising.  Psychiatric: No anxiety or depression.    Physical Exam:  Physical Exam  /81   Pulse 86   Temp 36.5 °C (97.7 °F)   Ht 1.549 m (5' 1\")   Wt 45.4 kg (100 lb)   LMP 01/01/2004 (Approximate)   SpO2 95%   BMI 18.89 kg/m²   Constitutional:       General: Patient is not in acute distress.     Appearance: Normal appearance; not ill-appearing.   HENT:      Head: Normocephalic.      Nose: No congestion or rhinorrhea.   Cardiovascular:      Rate and Rhythm: Normal rate and regular rhythm.      Pulses: Normal pulses.   Pulmonary:      Effort: Pulmonary effort is normal. No respiratory distress.  No conversational dyspnea. Rt VATS incisions c/d/i     Breath sounds: No stridor. No wheezing.   Abdominal:      General: There is no distension.      Palpations: Abdomen is soft.      Tenderness: There is no abdominal tenderness.   Musculoskeletal:         General: No swelling, tenderness or deformity. Normal range of motion.      Cervical back: Normal range of motion. No rigidity.   Lymphadenopathy:      Cervical: No cervical adenopathy.   Skin:     General: Skin is warm and dry.   Neurological:      General: No focal deficit present.      Mental Status: Patient is alert and oriented to person, place, and time.   Psychiatric:         Mood and Affect: Mood normal.     Relevant Results    Pathology:  Surgical Pathology Exam: [id number]  Order: [id number]   Collected 2/3/2025 10:33       Status: Final result       Visible to patient: Yes (not seen)       Dx: Lung nodules    0 Result Notes       Component "  Resulting Agency   FINAL DIAGNOSIS      A.  LUNG, RIGHT UPPER LOBE, WEDGE RESECTION:  - Adenocarcinoma, acinar and lepidic pattern (1.6 cm), involving lung parenchyma, see synoptic report and note.  - Margins are are negative for carcinoma.   - One lymph node with no evidence of carcinoma (0/1).      NOTE: By immunohistochemical staining, tumor cells are positive for TTF-1. PD-L1 immunostain results are reported below. Molecular studies will be ordered and results will be reported in an addendum. Multiple deeper levels of A3 are reviewed to evaluate the pleura.     B.  LYMPH NODE, LEVEL 7, EXCISION:  - One lymph node with no evidence of carcinoma (0/1).      C.  LYMPH NODE, LEVEL 10, EXCISION:  - One lymph node with no evidence of carcinoma (0/1).      D.  LYMPH NODE, LEVEL 11, EXCISION:  - One lymph node with no evidence of carcinoma (0/1).      E.  LYMPH NODE, LEVEL 4R, EXCISION:  - One lymph node with no evidence of carcinoma (0/1).      celso   Electronically signed by Angela Kelsey DO on 2/18/2025 at 0913      [facility]   By the signature on this report, the individual or group listed as making the Final Interpretation/Diagnosis certifies that they have reviewed this case.    Comment  [facility]   PD-L1 22C3  by Immunohistochemistry with Interpretation, pembrolizumab  (KEYTRUDA)     Block used:  A3     Interpretation: High Expression     Tumor Proportion Score (TPS): 80%        Intended use:  PD-L1 22C3 by IHC with Interpretation is a qualitative immunohistochemical assay using Monoclonal Mouse Anti-PD-L1, Clone 22C3 intended for use in the detection of PD-L1 protein in formalin-fixed, paraffin-embedded (FFPE) non-small cell lung cancer (NSCLC) tissue using the Optiview detection on a [employer] BenchMark Ultra. The specimen submitted for testing should contain at least 100 viable tumor cells to be considered adequate for evaluation. This assay is indicated as an aid in identifying NSCLC patients for treatment with  pembrolizumab (KEYTRUDA).     Methodology:  PD-L1 protein expression is determined by using Tumor Proportion Score (TPS), which is the percentage of viable tumor cells showing partial or complete membrane staining at any intensity. In the clinical setting of first-line therapy (treatment-naïve patients), the specimen is considered PD-L1 positive if the TPS is equal to or greater than 50 percent. In the setting of second-line therapy, the specimen is considered positive if the TPS is equal to or greater than 1 percent.     Reference Range:  High Expression >=50% TPS  Low Expression 1-49% TPS  No Expression <1% TPS     This assay is validated and FDA-approved for lung cancer specimens only. For all other specimen types, results should be interpreted with caution and within the appropriate clinical context. The use of this assay on decalcified tissues has not been validated and is not recommended.   One or more of the reagents used to perform assays on this specimen MAY have contained components considered to be Laboratory Developed Tests (LDT).  LDT's have not been cleared or approved by the U.S. Food and Drug Administration.  These assays/tests were developed and their performance characteristics determined by the Department of Pathology Immunohistochemistry Lab at [facility]. The FDA does not require this test to go through premarket FDA review. This test is used for clinical purposes. It should not be regarded as investigational or for research. This laboratory is certified under the Clinical Laboratory Improvement Amendments (CLIA) as qualified to perform high complexity clinical laboratory testing.  The assays/tests were performed with appropriate positive and negative controls which stained appropriately.    Case Summary Report   LUNG   8th Edition - Protocol posted: 9/21/2022LUNG - All Specimens  SPECIMEN   Procedure  Wedge resection   Specimen Laterality  Right   TUMOR    Tumor Focality  Single focus   Tumor Site  Upper lobe of lung   Tumor Size     Total Tumor Size (size of entire tumor)  Greatest Dimension (Centimeters): 1.6 cm   Size of Invasive Component  Greatest Dimension (Centimeters): 1.3 cm   Histologic Type  Invasive acinar adenocarcinoma   Histologic Patterns Present  Acinar: 70     Papillary: 10     Lepidic: 20   Histologic Grade  G2, moderately differentiated   Spread Through Air Spaces (CHRISTY)  Not identified   Visceral Pleura Invasion  Not identified   Direct Invasion of Adjacent Structures  Not applicable (no adjacent structures present)   Treatment Effect  No known presurgical therapy   Lymphovascular Invasion  Not identified   MARGINS   Margin Status for Invasive Carcinoma  All margins negative for invasive carcinoma   Closest Margin(s) to Invasive Carcinoma  Parenchymal   Distance from Invasive Carcinoma to Closest Margin  1.0 cm   Margin Status for Non-Invasive Tumor  All margins negative for non-invasive tumor   REGIONAL LYMPH NODES   Lymph Node(s) from Prior Procedures  No known prior lymph node sampling performed   Regional Lymph Node Status  All regional lymph nodes negative for tumor   Number of Lymph Nodes Examined  5   Elda Site(s) Examined  4R: Lower paratracheal     10R: Hilar     11R: Interlobar     13R: Segmental     7: Subcarinal   PATHOLOGIC STAGE CLASSIFICATION (pTNM, AJCC 8th Edition)   Reporting of pT, pN, and (when applicable) pM categories is based on information available to the pathologist at the time the report is issued. As per the AJCC (Chapter 1, 8th Ed.) it is the managing physician’s responsibility to establish the final pathologic stage based upon all pertinent information, including but potentially not limited to this pathology report.   pT Category  pT1b   pN Category  pN0   ADDITIONAL FINDINGS   Additional Findings  Metaplasia: osseous       Emphysema     Respiratory bronchiolitis             Imaging:    Pulmonary Functions Testing  "Results:    No results found for: \"FEV1\", \"FVC\", \"IKQ3DMI\", \"TLC\", \"DLCO\"    CXR personally reviewed     Assessment/Plan   Problem List Items Addressed This Visit             ICD-10-CM    Lung nodules R91.8    Relevant Orders    XR chest 2 views       Ms. Shaw is a pleasant 64 year old female with a right apical 1.6cm subsolid lung nodule now s/p Rt VATS upper lobe wedge + MLND for a pT1bN0 acinar adenocarcinoma on 2/3/24.  Given this new information/diagnosis of lung cancer and patient's confirming pathology will plan for surveillance CT scanning for the next 5 years. I d/w the patient the plan for CT chest every 6 months for the first 2 years followed by annually to total 5 years. Patient voiced understanding.      Marlena Landin, DO  Thoracic & Esophageal Surgery     "

## 2025-02-18 NOTE — PATIENT INSTRUCTIONS
"Follow up in 6 months with CT chest prior. See appointment below in \"What's Next\".  Call our office with any questions. [phone]    "

## 2025-02-21 LAB
ELECTRONICALLY SIGNED BY: NORMAL
FOCUSED SOLID TUMOR DNA/RNA RESULTS: NORMAL

## 2025-02-25 ENCOUNTER — APPOINTMENT (OUTPATIENT)
Dept: RADIOLOGY | Facility: CLINIC | Age: 65
End: 2025-02-25
Payer: COMMERCIAL

## 2025-02-27 ENCOUNTER — HOSPITAL ENCOUNTER (OUTPATIENT)
Dept: RADIOLOGY | Facility: CLINIC | Age: 65
Discharge: HOME | End: 2025-02-27
Payer: COMMERCIAL

## 2025-02-27 VITALS — WEIGHT: 100 LBS | BODY MASS INDEX: 18.88 KG/M2 | HEIGHT: 61 IN

## 2025-02-27 DIAGNOSIS — Z01.419 ENCOUNTER FOR GYNECOLOGICAL EXAMINATION WITHOUT ABNORMAL FINDING: ICD-10-CM

## 2025-02-27 PROCEDURE — 77063 BREAST TOMOSYNTHESIS BI: CPT

## 2025-03-04 ENCOUNTER — APPOINTMENT (OUTPATIENT)
Dept: OBSTETRICS AND GYNECOLOGY | Facility: CLINIC | Age: 65
End: 2025-03-04
Payer: COMMERCIAL

## 2025-03-04 VITALS
BODY MASS INDEX: 18.69 KG/M2 | HEIGHT: 61 IN | HEART RATE: 82 BPM | WEIGHT: 99 LBS | DIASTOLIC BLOOD PRESSURE: 82 MMHG | SYSTOLIC BLOOD PRESSURE: 136 MMHG

## 2025-03-04 DIAGNOSIS — G47.00 INSOMNIA, UNSPECIFIED TYPE: ICD-10-CM

## 2025-03-04 DIAGNOSIS — Z01.419 ENCOUNTER FOR GYNECOLOGICAL EXAMINATION WITHOUT ABNORMAL FINDING: Primary | ICD-10-CM

## 2025-03-04 DIAGNOSIS — M85.80 OSTEOPENIA, UNSPECIFIED LOCATION: ICD-10-CM

## 2025-03-04 DIAGNOSIS — N95.2 ATROPHY OF VAGINA: ICD-10-CM

## 2025-03-04 PROCEDURE — 3008F BODY MASS INDEX DOCD: CPT | Performed by: OBSTETRICS & GYNECOLOGY

## 2025-03-04 PROCEDURE — 3079F DIAST BP 80-89 MM HG: CPT | Performed by: OBSTETRICS & GYNECOLOGY

## 2025-03-04 PROCEDURE — 88175 CYTOPATH C/V AUTO FLUID REDO: CPT

## 2025-03-04 PROCEDURE — 3075F SYST BP GE 130 - 139MM HG: CPT | Performed by: OBSTETRICS & GYNECOLOGY

## 2025-03-04 PROCEDURE — 4004F PT TOBACCO SCREEN RCVD TLK: CPT | Performed by: OBSTETRICS & GYNECOLOGY

## 2025-03-04 PROCEDURE — 87624 HPV HI-RISK TYP POOLED RSLT: CPT

## 2025-03-04 PROCEDURE — 99396 PREV VISIT EST AGE 40-64: CPT | Performed by: OBSTETRICS & GYNECOLOGY

## 2025-03-04 RX ORDER — ZOLPIDEM TARTRATE 10 MG/1
10 TABLET ORAL NIGHTLY PRN
Qty: 30 TABLET | Refills: 0 | Status: SHIPPED | OUTPATIENT
Start: 2025-03-04 | End: 2025-03-04 | Stop reason: SDUPTHER

## 2025-03-04 RX ORDER — ESTRADIOL 0.1 MG/G
0.5 CREAM VAGINAL 3 TIMES WEEKLY
Qty: 42.5 G | Refills: 3 | Status: SHIPPED | OUTPATIENT
Start: 2025-03-05

## 2025-03-04 RX ORDER — ZOLPIDEM TARTRATE 10 MG/1
10 TABLET ORAL NIGHTLY PRN
Qty: 30 TABLET | Refills: 0 | Status: SHIPPED | OUTPATIENT
Start: 2025-03-04

## 2025-03-04 NOTE — PROGRESS NOTES
Subjective   Gifty Shaw is a 64 y.o. female here for a routine exam.  Since her last visit in 2024 she had a routine screening CT scan due to smoking history which found a mass.  She is now status post resection of adenocarcinoma of the right lung on February 3, 2025.  The margins were negative, no chemotherapy needed.  She has been feeling well.  She has no postmenopausal bleeding or discharge.  No dysuria or change in bowel habits.  She is current on her colonoscopy.    We did discuss overactive bladder symptoms occasionally.    Her bone density in 2021 showed osteopenia of the spine, T-score -2.0.     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    She has been using zolpidem 10 mg for insomnia and requests a refill.  We did review the CSA and this was signed today.    Personal health questionnaire reviewed: yes.     Gynecologic History  Patient's last menstrual period was 2004 (approximate).  Contraception: post menopausal status  Last Pap: 22. Results were: normal  Last mammogram: 25. Results were: normal    Obstetric History  OB History    Para Term  AB Living   0 0 0         SAB IAB Ectopic Multiple Live Births                   Objective   Constitutional: Alert and in no acute distress. Well developed, well nourished.   Head and Face: Head and face: Normal.    Eyes: Normal external exam - nonicteric sclera, extraocular movements intact (EOMI) and no ptosis.   Neck: No neck asymmetry. Supple. Thyroid not enlarged and there were no palpable thyroid nodules.    Pulmonary: No respiratory distress.   Chest: Breasts: Normal appearance, no nipple discharge and no skin changes. Palpation of breasts and axillae: No palpable mass and no axillary lymphadenopathy.  Well-healed laparoscopy scars noted on her right side from recent resection of cancer.  Abdomen: Soft nontender; no abdominal mass palpated. No organomegaly. No hernias.    Genitourinary: External genitalia: Normal. No inguinal lymphadenopathy. Bartholin's Urethral and Skenes Glands: Normal. Urethra: Normal.  Bladder: Normal on palpation. Vagina: Normal. Cervix: Normal.  Uterus: Normal.  Right Adnexa/parametria: Normal.  Left Adnexa/parametria: Normal.  Inspection of Perianal Area: Normal.   Musculoskeletal: No joint swelling seen, normal movements of all extremities.   Skin: Normal skin color and pigmentation, normal skin turgor, and no rash.   Neurologic: Non-focal. Grossly intact.   Psychiatric: Alert and oriented x 3. Affect normal to patient baseline. Mood: Appropriate.  Physical Exam     Assessment/Plan   Healthy female exam.  This is a 64-year-old female with a normal exam.  A Pap smear was sent.    Her routine mammogram was normal in February 2025 and ordered for 2026.    A refill for estradiol cream was ordered to the pharmacy for the genitourinary syndrome menopause.  Zolpidem 10 mg was also refilled for insomnia.    We discussed obtaining a bone density test to monitor the osteopenia of the spine.    I will see her routinely in 1 year.  Education reviewed: self breast exams.  Mammogram ordered.

## 2025-03-19 LAB
CYTOLOGY CMNT CVX/VAG CYTO-IMP: NORMAL
HPV HR 12 DNA GENITAL QL NAA+PROBE: NEGATIVE
HPV HR GENOTYPES PNL CVX NAA+PROBE: NEGATIVE
HPV16 DNA SPEC QL NAA+PROBE: NEGATIVE
HPV18 DNA SPEC QL NAA+PROBE: NEGATIVE
LAB AP HISTORY OF MALIGNANCY: NORMAL
LAB AP HPV GENOTYPE QUESTION: YES
LAB AP HPV HR: NORMAL
LABORATORY COMMENT REPORT: NORMAL
MENSTRUAL HX REPORTED: NORMAL
PATH REPORT.TOTAL CANCER: NORMAL

## 2025-06-04 ENCOUNTER — TELEPHONE (OUTPATIENT)
Facility: CLINIC | Age: 65
End: 2025-06-04
Payer: COMMERCIAL

## 2025-06-04 DIAGNOSIS — G47.00 INSOMNIA, UNSPECIFIED TYPE: ICD-10-CM

## 2025-06-04 RX ORDER — ZOLPIDEM TARTRATE 10 MG/1
10 TABLET ORAL NIGHTLY PRN
Qty: 30 TABLET | Refills: 0 | Status: SHIPPED | OUTPATIENT
Start: 2025-06-04 | End: 2025-06-04 | Stop reason: SDUPTHER

## 2025-06-04 RX ORDER — ZOLPIDEM TARTRATE 10 MG/1
10 TABLET ORAL NIGHTLY PRN
Qty: 30 TABLET | Refills: 0 | Status: SHIPPED | OUTPATIENT
Start: 2025-06-04

## 2025-06-04 NOTE — TELEPHONE ENCOUNTER
The patient called to request a refill of zolpidem 10 mg.  She wanted it sent to the [facility] pharmacy in [place].

## 2025-06-14 ENCOUNTER — HOSPITAL ENCOUNTER (EMERGENCY)
Facility: HOSPITAL | Age: 65
Discharge: HOME | End: 2025-06-15
Attending: EMERGENCY MEDICINE
Payer: COMMERCIAL

## 2025-06-14 ENCOUNTER — APPOINTMENT (OUTPATIENT)
Dept: RADIOLOGY | Facility: HOSPITAL | Age: 65
End: 2025-06-14
Payer: COMMERCIAL
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
## 2025-06-14 DIAGNOSIS — F19.920 INTOXICATION BY DRUG, UNCOMPLICATED (MULTI): ICD-10-CM

## 2025-06-14 DIAGNOSIS — S12.9XXA CLOSED FRACTURE OF SPINOUS PROCESS OF CERVICAL VERTEBRA, INITIAL ENCOUNTER: ICD-10-CM

## 2025-06-14 DIAGNOSIS — S02.609B OPEN FRACTURE OF MANDIBLE, UNSPECIFIED LATERALITY, UNSPECIFIED MANDIBULAR SITE, INITIAL ENCOUNTER (MULTI): ICD-10-CM

## 2025-06-14 PROCEDURE — 76377 3D RENDER W/INTRP POSTPROCES: CPT

## 2025-06-14 PROCEDURE — 70486 CT MAXILLOFACIAL W/O DYE: CPT | Performed by: RADIOLOGY

## 2025-06-14 PROCEDURE — 2500000004 HC RX 250 GENERAL PHARMACY W/ HCPCS (ALT 636 FOR OP/ED): Performed by: PHARMACIST

## 2025-06-14 PROCEDURE — 70486 CT MAXILLOFACIAL W/O DYE: CPT

## 2025-06-14 PROCEDURE — 72125 CT NECK SPINE W/O DYE: CPT | Performed by: RADIOLOGY

## 2025-06-14 PROCEDURE — 90715 TDAP VACCINE 7 YRS/> IM: CPT | Performed by: PHARMACIST

## 2025-06-14 PROCEDURE — 70450 CT HEAD/BRAIN W/O DYE: CPT

## 2025-06-14 PROCEDURE — 76377 3D RENDER W/INTRP POSTPROCES: CPT | Performed by: RADIOLOGY

## 2025-06-14 PROCEDURE — 90471 IMMUNIZATION ADMIN: CPT | Performed by: PHARMACIST

## 2025-06-14 PROCEDURE — 70450 CT HEAD/BRAIN W/O DYE: CPT | Performed by: RADIOLOGY

## 2025-06-14 PROCEDURE — 99284 EMERGENCY DEPT VISIT MOD MDM: CPT | Mod: 25 | Performed by: EMERGENCY MEDICINE

## 2025-06-14 PROCEDURE — 72125 CT NECK SPINE W/O DYE: CPT

## 2025-06-14 RX ADMIN — TETANUS TOXOID, REDUCED DIPHTHERIA TOXOID AND ACELLULAR PERTUSSIS VACCINE, ADSORBED 0.5 ML: 5; 2.5; 8; 8; 2.5 SUSPENSION INTRAMUSCULAR at 22:51

## 2025-06-14 ASSESSMENT — PAIN SCALES - GENERAL: PAINLEVEL_OUTOF10: 3

## 2025-06-14 ASSESSMENT — PAIN - FUNCTIONAL ASSESSMENT: PAIN_FUNCTIONAL_ASSESSMENT: 0-10

## 2025-06-15 VITALS
RESPIRATION RATE: 16 BRPM | TEMPERATURE: 97.1 F | SYSTOLIC BLOOD PRESSURE: 100 MMHG | DIASTOLIC BLOOD PRESSURE: 69 MMHG | HEART RATE: 85 BPM | OXYGEN SATURATION: 93 %

## 2025-06-15 LAB
ALBUMIN SERPL BCP-MCNC: 4.1 G/DL (ref 3.4–5)
ALP SERPL-CCNC: 70 U/L (ref 33–136)
ALT SERPL W P-5'-P-CCNC: 16 U/L (ref 7–45)
ANION GAP SERPL CALC-SCNC: 16 MMOL/L (ref 10–20)
AST SERPL W P-5'-P-CCNC: 25 U/L (ref 9–39)
BASOPHILS # BLD AUTO: 0.07 X10*3/UL (ref 0–0.1)
BASOPHILS NFR BLD AUTO: 0.5 %
BILIRUB SERPL-MCNC: 0.2 MG/DL (ref 0–1.2)
BUN SERPL-MCNC: 13 MG/DL (ref 6–23)
CALCIUM SERPL-MCNC: 9.1 MG/DL (ref 8.6–10.3)
CHLORIDE SERPL-SCNC: 96 MMOL/L (ref 98–107)
CO2 SERPL-SCNC: 24 MMOL/L (ref 21–32)
CREAT SERPL-MCNC: 0.61 MG/DL (ref 0.5–1.05)
EGFRCR SERPLBLD CKD-EPI 2021: >90 ML/MIN/1.73M*2
EOSINOPHIL # BLD AUTO: 0.26 X10*3/UL (ref 0–0.7)
EOSINOPHIL NFR BLD AUTO: 1.7 %
ERYTHROCYTE [DISTWIDTH] IN BLOOD BY AUTOMATED COUNT: 11.6 % (ref 11.5–14.5)
GLUCOSE SERPL-MCNC: 113 MG/DL (ref 74–99)
HCT VFR BLD AUTO: 39.1 % (ref 36–46)
HGB BLD-MCNC: 13.7 G/DL (ref 12–16)
IMM GRANULOCYTES # BLD AUTO: 0.17 X10*3/UL (ref 0–0.7)
IMM GRANULOCYTES NFR BLD AUTO: 1.1 % (ref 0–0.9)
INR PPP: 1 (ref 0.9–1.1)
LYMPHOCYTES # BLD AUTO: 2.31 X10*3/UL (ref 1.2–4.8)
LYMPHOCYTES NFR BLD AUTO: 14.9 %
MCH RBC QN AUTO: 33.8 PG (ref 26–34)
MCHC RBC AUTO-ENTMCNC: 35 G/DL (ref 32–36)
MCV RBC AUTO: 97 FL (ref 80–100)
MONOCYTES # BLD AUTO: 0.95 X10*3/UL (ref 0.1–1)
MONOCYTES NFR BLD AUTO: 6.1 %
NEUTROPHILS # BLD AUTO: 11.73 X10*3/UL (ref 1.2–7.7)
NEUTROPHILS NFR BLD AUTO: 75.7 %
NRBC BLD-RTO: 0 /100 WBCS (ref 0–0)
PLATELET # BLD AUTO: 280 X10*3/UL (ref 150–450)
POTASSIUM SERPL-SCNC: 2.8 MMOL/L (ref 3.5–5.3)
PROT SERPL-MCNC: 6.6 G/DL (ref 6.4–8.2)
PROTHROMBIN TIME: 11.1 SECONDS (ref 9.8–12.4)
RBC # BLD AUTO: 4.05 X10*6/UL (ref 4–5.2)
SODIUM SERPL-SCNC: 133 MMOL/L (ref 136–145)
WBC # BLD AUTO: 15.5 X10*3/UL (ref 4.4–11.3)

## 2025-06-15 PROCEDURE — 84075 ASSAY ALKALINE PHOSPHATASE: CPT | Performed by: EMERGENCY MEDICINE

## 2025-06-15 PROCEDURE — 85610 PROTHROMBIN TIME: CPT | Performed by: EMERGENCY MEDICINE

## 2025-06-15 PROCEDURE — 96366 THER/PROPH/DIAG IV INF ADDON: CPT | Mod: 59

## 2025-06-15 PROCEDURE — 85025 COMPLETE CBC W/AUTO DIFF WBC: CPT | Performed by: EMERGENCY MEDICINE

## 2025-06-15 PROCEDURE — 2500000004 HC RX 250 GENERAL PHARMACY W/ HCPCS (ALT 636 FOR OP/ED): Performed by: EMERGENCY MEDICINE

## 2025-06-15 PROCEDURE — 36415 COLL VENOUS BLD VENIPUNCTURE: CPT | Performed by: EMERGENCY MEDICINE

## 2025-06-15 PROCEDURE — 96367 TX/PROPH/DG ADDL SEQ IV INF: CPT | Mod: 59

## 2025-06-15 PROCEDURE — 2500000001 HC RX 250 WO HCPCS SELF ADMINISTERED DRUGS (ALT 637 FOR MEDICARE OP): Performed by: EMERGENCY MEDICINE

## 2025-06-15 PROCEDURE — 12011 RPR F/E/E/N/L/M 2.5 CM/<: CPT | Performed by: EMERGENCY MEDICINE

## 2025-06-15 PROCEDURE — 96365 THER/PROPH/DIAG IV INF INIT: CPT | Mod: 59

## 2025-06-15 PROCEDURE — 99291 CRITICAL CARE FIRST HOUR: CPT | Mod: 25 | Performed by: EMERGENCY MEDICINE

## 2025-06-15 RX ORDER — CEPHALEXIN 500 MG/1
500 CAPSULE ORAL 2 TIMES DAILY
Qty: 14 CAPSULE | Refills: 0 | Status: SHIPPED | OUTPATIENT
Start: 2025-06-15 | End: 2025-06-29 | Stop reason: HOSPADM

## 2025-06-15 RX ORDER — POTASSIUM CHLORIDE 14.9 MG/ML
20 INJECTION INTRAVENOUS ONCE
Status: COMPLETED | OUTPATIENT
Start: 2025-06-15 | End: 2025-06-15

## 2025-06-15 RX ORDER — POTASSIUM CHLORIDE 1.5 G/1.58G
40 POWDER, FOR SOLUTION ORAL ONCE
Status: COMPLETED | OUTPATIENT
Start: 2025-06-15 | End: 2025-06-15

## 2025-06-15 RX ORDER — CEFAZOLIN SODIUM 2 G/50ML
2 SOLUTION INTRAVENOUS ONCE
Status: COMPLETED | OUTPATIENT
Start: 2025-06-15 | End: 2025-06-15

## 2025-06-15 RX ORDER — OXYCODONE HYDROCHLORIDE 5 MG/1
5 TABLET ORAL EVERY 6 HOURS PRN
Qty: 9 TABLET | Refills: 0 | Status: SHIPPED | OUTPATIENT
Start: 2025-06-15

## 2025-06-15 RX ADMIN — CEFAZOLIN SODIUM 2 G: 2 SOLUTION INTRAVENOUS at 01:27

## 2025-06-15 RX ADMIN — POTASSIUM CHLORIDE 20 MEQ: 14.9 INJECTION, SOLUTION INTRAVENOUS at 02:10

## 2025-06-15 RX ADMIN — POTASSIUM CHLORIDE 40 MEQ: 1.5 POWDER, FOR SOLUTION ORAL at 02:00

## 2025-06-15 NOTE — ED PROVIDER NOTES
Limitations to History: Intoxication  Additional History Obtained from: Spouse    HPI:    Pt presented to the ed after a fall. Per ems and the patient she took an ambien, drank alcohol and had an edible. She states she stood up and was walking when she fell over and struck her chin. Denies other ingestion. Denies self harm. States she has had some of the medication prior to this. Denies use of blood thinners. Denied cp, sob, dizziness or lightheadedness. States she is unsure if she fell or passed out. Ros was otherwise negative.     ------------------------------------------------------------------------------------------------------------------------------------------  Physical Exam:    ED Triage Vitals [06/14/25 2214]   Temperature Heart Rate Respirations BP   36.2 °C (97.1 °F) 85 16 100/69      Pulse Ox Temp Source Heart Rate Source Patient Position   99 % Tympanic Monitor --      BP Location FiO2 (%)     -- --        VS: As documented in the triage note and EMR flowsheet from this visit were reviewed.  General: Well appearing. No acute distress.   Eyes: Pupils round and reactive. No scleral icterus. No conjunctival injection  HENT:laceration to chin with mild oozing, no arterial blood flow; decreased rom in mouth/jaw making teeth evaluation limited. No malocclusion; +ttp over b/l mandibles Normocephalic. Moist mucous membranes. Trachea midline  CV: RRR, No MRG. No pedal edema appreciated.  Resp: Clear to auscultation bilaterally. Non-labored.    GI: Soft, nontender to palpation. Nondistended. No guarding, rigidity or rebound  Skin: Warm, dry, intact. No systemic rashes or lesions appreciated. Laceration as above  Extremities: No deformities or pain out of proportion; pulses intact   MSK: no c/t/l spine ttp  Neuro: Alert. Slow to answer questions but answers them appropriately; moving extremities equally; oriented x4; appears intoxicated  Psych: Appropriate.  Zeust.    ------------------------------------------------------------------------------------------------------------------------------------------    Medical Decision Making  Pt presented to the ed after a fall vs syncope. She is hd stable and has a laceration but is otherwise well appearing. Nv intact; no signs of focal deficit. Will obtain imaging to evaluate for traumatic injuries and monitor patient's neuro exam in the ed. Anticipate dispo pending the above and lac repair      External Records Reviewed: I reviewed recent and relevant outside records including: HIE/Community Record  Escalation of Care: Appropriate for Discharge per ED course/MDM  Social Determinants Affecting Care:Not applicable  Prescription Drug Consideration: per orders  Diagnostic testing considered: per orders  Discussion of Management with Other Providers: I discussed the patient/results with: per mdm    Objective Data  I have independently interpreted the following labs, imaging studies and MDM added to ED Course  Labs Reviewed   CBC WITH AUTO DIFFERENTIAL - Abnormal       Result Value    WBC 15.5 (*)     nRBC 0.0      RBC 4.05      Hemoglobin 13.7      Hematocrit 39.1      MCV 97      MCH 33.8      MCHC 35.0      RDW 11.6      Platelets 280      Neutrophils % 75.7      Immature Granulocytes %, Automated 1.1 (*)     Lymphocytes % 14.9      Monocytes % 6.1      Eosinophils % 1.7      Basophils % 0.5      Neutrophils Absolute 11.73 (*)     Immature Granulocytes Absolute, Automated 0.17      Lymphocytes Absolute 2.31      Monocytes Absolute 0.95      Eosinophils Absolute 0.26      Basophils Absolute 0.07     COMPREHENSIVE METABOLIC PANEL - Abnormal    Glucose 113 (*)     Sodium 133 (*)     Potassium 2.8 (*)     Chloride 96 (*)     Bicarbonate 24      Anion Gap 16      Urea Nitrogen 13      Creatinine 0.61      eGFR >90      Calcium 9.1      Albumin 4.1      Alkaline Phosphatase 70      Total Protein 6.6      AST 25      Bilirubin, Total 0.2       ALT 16     PROTIME-INR - Normal    Protime 11.1      INR 1.0         CT maxillofacial bones wo IV contrast   Final Result   No acute intracranial abnormality.        Acute, mildly displaced and impacted fractures of the bilateral   mandibular necks, extending to the mandibular condyles. Acute   nondisplaced fracture of the mandibular symphysis.        Age indeterminate, nondisplaced fracture of the C4 spinous process.   Please correlate for point tenderness.                  MACRO:   None        Signed by: Arina Hammonds 6/14/2025 11:29 PM   Dictation workstation:   [id number]      CT head wo IV contrast   Final Result   No acute intracranial abnormality.        Acute, mildly displaced and impacted fractures of the bilateral   mandibular necks, extending to the mandibular condyles. Acute   nondisplaced fracture of the mandibular symphysis.        Age indeterminate, nondisplaced fracture of the C4 spinous process.   Please correlate for point tenderness.                  MACRO:   None        Signed by: Arina Hammonds 6/14/2025 11:29 PM   Dictation workstation:   [id number]      CT cervical spine wo IV contrast   Final Result   No acute intracranial abnormality.        Acute, mildly displaced and impacted fractures of the bilateral   mandibular necks, extending to the mandibular condyles. Acute   nondisplaced fracture of the mandibular symphysis.        Age indeterminate, nondisplaced fracture of the C4 spinous process.   Please correlate for point tenderness.                  MACRO:   None        Signed by: Arina Hammonds 6/14/2025 11:29 PM   Dictation workstation:   [id number]      CT 3D reconstruction   Final Result   No acute intracranial abnormality.        Acute, mildly displaced and impacted fractures of the bilateral   mandibular necks, extending to the mandibular condyles. Acute   nondisplaced fracture of the mandibular symphysis.        Age indeterminate, nondisplaced fracture of the C4 spinous  process.   Please correlate for point tenderness.                  MACRO:   None        Signed by: Arina Cassius 6/14/2025 11:29 PM   Dictation workstation:   [id number]          ED Course  ED Course as of 06/20/25 1358   Sat Jun 14, 2025   3146 Patient seen and evaluated, had half of an Ambien, an edible and drink alcohol tonight and then passed out landing on the ground striking her jaw.  Patient denying any blood thinners.  Patient groggy but answering questions appropriately.  Moving extremities equally.  Noted to have a 2 cm laceration to her chin with mild oozing, no pulsatile blood flow.  Denies any numbness, tingling, neck discomfort.  Does have left-sided jaw discomfort.  Will obtain imaging to evaluate for intracranial or maxillofacial injury, repair of her laceration and provide tetanus shot. [LP]   Sun Hans 15, 2025   0114 Spoke with plastic surgery who is covering facial trauma, they stated that this fracture would be nonop management.  They recommended a soft diet and outpatient follow-up.  They felt like this was going to be more likely a TMJ issue and recommended discussion with ear nose and throat.  Patient was reevaluated, TMs were reviewed, no blood in canals noted.  Patient and family updated with current recommendations.  Awaiting ENT consultation [LP]   0136 Transfer center reached out to ENT who recommended [facility] [LP]   0148 Spoke with [facility] who recommended outpatient follow up at [phone] [LP]   0328 Updated with recs from [facility]. Pt tolerating K without issue. Will trial ambulation. Pt without midline c psine ttp throughout f/e/abduction/adduction and axial loading. No numbness/tingling or weakness in US; feel CT of c spine findings are likely not acute based on the lack of pain associated with the location [LP]      ED Course User Index  [LP] Diane Rothman DO         Diagnoses as of 06/20/25 1358   Fall, initial encounter   Intoxication by drug, uncomplicated (Multi)   Closed fracture of  spinous process of cervical vertebra, initial encounter   Open fracture of mandible, unspecified laterality, unspecified mandibular site, initial encounter (Multi)       Procedure  Laceration Repair    Performed by: Diane Rothman DO  Authorized by: Diane Rotmhan DO    Consent:     Consent obtained:  Verbal    Consent given by:  Patient and spouse    Risks, benefits, and alternatives were discussed: yes      Risks discussed:  Infection, pain, need for additional repair, nerve damage, poor cosmetic result and poor wound healing  Universal protocol:     Patient identity confirmed:  Verbally with patient  Anesthesia:     Anesthesia method:  Local infiltration    Local anesthetic:  Lidocaine 1% w/o epi  Laceration details:     Location:  Face    Face location:  Chin    Length (cm):  2    Depth (mm):  2  Pre-procedure details:     Preparation:  Patient was prepped and draped in usual sterile fashion and imaging obtained to evaluate for foreign bodies  Exploration:     Limited defect created (wound extended): no      Imaging outcome: foreign body not noted      Wound exploration: wound explored through full range of motion      Wound extent: areolar tissue not violated, fascia not violated, no foreign body, no signs of injury, no nerve damage, no tendon damage, no underlying fracture and no vascular damage      Contaminated: no    Treatment:     Area cleansed with:  Povidone-iodine    Amount of cleaning:  Standard    Debridement:  None    Undermining:  None    Scar revision: no    Skin repair:     Repair method:  Sutures    Suture size:  5-0    Suture material:  Nylon    Suture technique:  Simple interrupted    Number of sutures:  4  Approximation:     Approximation:  Close  Repair type:     Repair type:  Simple  Post-procedure details:     Dressing:  Non-adherent dressing    Procedure completion:  Tolerated  Critical Care    Performed by: Diane Rothman DO  Authorized by: Diane Rothman DO    Critical care provider  statement:     Critical care time (minutes):  31    Critical care time was exclusive of:  Separately billable procedures and treating other patients and teaching time    Critical care was necessary to treat or prevent imminent or life-threatening deterioration of the following conditions:  Trauma    Critical care was time spent personally by me on the following activities:  Discussions with consultants, evaluation of patient's response to treatment, examination of patient, ordering and performing treatments and interventions, pulse oximetry, ordering and review of radiographic studies, ordering and review of laboratory studies, re-evaluation of patient's condition and review of old charts    I assumed direction of critical care for this patient from another provider in my specialty: no        Disposition: sandi Rothman DO  Emergency Medicine  Medical Toxicology     Diane Rothman DO  06/20/25 5161

## 2025-06-15 NOTE — ED TRIAGE NOTES
Patient presents to the ED s/p syncopal episode this evening. States she had a drink(s), took 1/2 ambien, and took an edible this evening. As per EMS, patient passed out at home and hit chin on the ground. Noted laceration underneath the chin, bruising to the lip noted at this time. Patient alert & oriented x 4. Pending MD initial eval and plan of care.

## 2025-06-23 ENCOUNTER — HOSPITAL ENCOUNTER (OUTPATIENT)
Facility: HOSPITAL | Age: 65
Setting detail: OUTPATIENT SURGERY
End: 2025-06-23
Attending: DENTIST | Admitting: DENTIST
Payer: COMMERCIAL

## 2025-06-26 DIAGNOSIS — S02.611A: ICD-10-CM

## 2025-06-26 DIAGNOSIS — S02.612A: Primary | ICD-10-CM

## 2025-06-26 DIAGNOSIS — S02.66XA: ICD-10-CM

## 2025-06-28 ENCOUNTER — ANESTHESIA EVENT (OUTPATIENT)
Dept: OPERATING ROOM | Facility: HOSPITAL | Age: 65
End: 2025-06-28
Payer: COMMERCIAL

## 2025-06-29 ENCOUNTER — ANESTHESIA (OUTPATIENT)
Dept: OPERATING ROOM | Facility: HOSPITAL | Age: 65
End: 2025-06-29
Payer: COMMERCIAL

## 2025-06-29 ENCOUNTER — HOSPITAL ENCOUNTER (OUTPATIENT)
Facility: HOSPITAL | Age: 65
Setting detail: OUTPATIENT SURGERY
Discharge: HOME | End: 2025-06-29
Attending: DENTIST | Admitting: DENTIST
Payer: COMMERCIAL

## 2025-06-29 VITALS
HEIGHT: 61 IN | DIASTOLIC BLOOD PRESSURE: 84 MMHG | OXYGEN SATURATION: 95 % | TEMPERATURE: 98.6 F | WEIGHT: 93.9 LBS | SYSTOLIC BLOOD PRESSURE: 152 MMHG | BODY MASS INDEX: 17.73 KG/M2 | RESPIRATION RATE: 21 BRPM | HEART RATE: 91 BPM

## 2025-06-29 LAB
ABO GROUP (TYPE) IN BLOOD: NORMAL
ANTIBODY SCREEN: NORMAL
RH FACTOR (ANTIGEN D): NORMAL

## 2025-06-29 PROCEDURE — 2500000005 HC RX 250 GENERAL PHARMACY W/O HCPCS: Performed by: DENTIST

## 2025-06-29 PROCEDURE — A21470 PR OPEN RX MANDIBLE CONDYLE FX,COMPL

## 2025-06-29 PROCEDURE — 36415 COLL VENOUS BLD VENIPUNCTURE: CPT | Performed by: DENTIST

## 2025-06-29 PROCEDURE — 7100000002 HC RECOVERY ROOM TIME - EACH INCREMENTAL 1 MINUTE: Performed by: DENTIST

## 2025-06-29 PROCEDURE — 7100000001 HC RECOVERY ROOM TIME - INITIAL BASE CHARGE: Performed by: DENTIST

## 2025-06-29 PROCEDURE — 3700000001 HC GENERAL ANESTHESIA TIME - INITIAL BASE CHARGE: Performed by: DENTIST

## 2025-06-29 PROCEDURE — 2500000004 HC RX 250 GENERAL PHARMACY W/ HCPCS (ALT 636 FOR OP/ED): Performed by: DENTIST

## 2025-06-29 PROCEDURE — 3600000008 HC OR TIME - EACH INCREMENTAL 1 MINUTE - PROCEDURE LEVEL THREE: Performed by: DENTIST

## 2025-06-29 PROCEDURE — 2500000004 HC RX 250 GENERAL PHARMACY W/ HCPCS (ALT 636 FOR OP/ED)

## 2025-06-29 PROCEDURE — 86900 BLOOD TYPING SEROLOGIC ABO: CPT | Performed by: DENTIST

## 2025-06-29 PROCEDURE — 3600000003 HC OR TIME - INITIAL BASE CHARGE - PROCEDURE LEVEL THREE: Performed by: DENTIST

## 2025-06-29 PROCEDURE — 7100000009 HC PHASE TWO TIME - INITIAL BASE CHARGE: Performed by: DENTIST

## 2025-06-29 PROCEDURE — 7100000010 HC PHASE TWO TIME - EACH INCREMENTAL 1 MINUTE: Performed by: DENTIST

## 2025-06-29 PROCEDURE — C1713 ANCHOR/SCREW BN/BN,TIS/BN: HCPCS | Performed by: DENTIST

## 2025-06-29 PROCEDURE — 2780000003 HC OR 278 NO HCPCS: Performed by: DENTIST

## 2025-06-29 PROCEDURE — 3700000002 HC GENERAL ANESTHESIA TIME - EACH INCREMENTAL 1 MINUTE: Performed by: DENTIST

## 2025-06-29 PROCEDURE — 2500000004 HC RX 250 GENERAL PHARMACY W/ HCPCS (ALT 636 FOR OP/ED): Performed by: STUDENT IN AN ORGANIZED HEALTH CARE EDUCATION/TRAINING PROGRAM

## 2025-06-29 PROCEDURE — 96372 THER/PROPH/DIAG INJ SC/IM: CPT

## 2025-06-29 PROCEDURE — A21470 PR OPEN RX MANDIBLE CONDYLE FX,COMPL: Performed by: STUDENT IN AN ORGANIZED HEALTH CARE EDUCATION/TRAINING PROGRAM

## 2025-06-29 PROCEDURE — 2720000007 HC OR 272 NO HCPCS: Performed by: DENTIST

## 2025-06-29 PROCEDURE — 2500000001 HC RX 250 WO HCPCS SELF ADMINISTERED DRUGS (ALT 637 FOR MEDICARE OP): Performed by: STUDENT IN AN ORGANIZED HEALTH CARE EDUCATION/TRAINING PROGRAM

## 2025-06-29 DEVICE — LIGATURE WIRE, BLUNT 160MM: Type: IMPLANTABLE DEVICE | Site: MANDIBLE | Status: FUNCTIONAL

## 2025-06-29 DEVICE — IMPLANTABLE DEVICE: Type: IMPLANTABLE DEVICE | Site: MANDIBLE | Status: FUNCTIONAL

## 2025-06-29 RX ORDER — ONDANSETRON HYDROCHLORIDE 2 MG/ML
INJECTION, SOLUTION INTRAVENOUS AS NEEDED
Status: DISCONTINUED | OUTPATIENT
Start: 2025-06-29 | End: 2025-06-29

## 2025-06-29 RX ORDER — ACETAMINOPHEN 325 MG/1
650 TABLET ORAL EVERY 4 HOURS PRN
Status: DISCONTINUED | OUTPATIENT
Start: 2025-06-29 | End: 2025-06-29 | Stop reason: HOSPADM

## 2025-06-29 RX ORDER — ROCURONIUM BROMIDE 10 MG/ML
INJECTION, SOLUTION INTRAVENOUS AS NEEDED
Status: DISCONTINUED | OUTPATIENT
Start: 2025-06-29 | End: 2025-06-29

## 2025-06-29 RX ORDER — ALBUTEROL SULFATE 0.83 MG/ML
2.5 SOLUTION RESPIRATORY (INHALATION) ONCE AS NEEDED
Status: DISCONTINUED | OUTPATIENT
Start: 2025-06-29 | End: 2025-06-29 | Stop reason: HOSPADM

## 2025-06-29 RX ORDER — SODIUM CHLORIDE, SODIUM GLUCONATE, SODIUM ACETATE, POTASSIUM CHLORIDE AND MAGNESIUM CHLORIDE 30; 37; 368; 526; 502 MG/100ML; MG/100ML; MG/100ML; MG/100ML; MG/100ML
INJECTION, SOLUTION INTRAVENOUS CONTINUOUS PRN
Status: DISCONTINUED | OUTPATIENT
Start: 2025-06-29 | End: 2025-06-29

## 2025-06-29 RX ORDER — LIDOCAINE HCL/PF 100 MG/5ML
SYRINGE (ML) INTRAVENOUS AS NEEDED
Status: DISCONTINUED | OUTPATIENT
Start: 2025-06-29 | End: 2025-06-29

## 2025-06-29 RX ORDER — HYDROMORPHONE HYDROCHLORIDE 0.2 MG/ML
0.2 INJECTION INTRAMUSCULAR; INTRAVENOUS; SUBCUTANEOUS EVERY 5 MIN PRN
Status: DISCONTINUED | OUTPATIENT
Start: 2025-06-29 | End: 2025-06-29 | Stop reason: HOSPADM

## 2025-06-29 RX ORDER — OXYCODONE HYDROCHLORIDE 5 MG/1
5 TABLET ORAL EVERY 4 HOURS PRN
Status: DISCONTINUED | OUTPATIENT
Start: 2025-06-29 | End: 2025-06-29 | Stop reason: HOSPADM

## 2025-06-29 RX ORDER — LIDOCAINE HYDROCHLORIDE 10 MG/ML
0.1 INJECTION, SOLUTION INFILTRATION; PERINEURAL ONCE
Status: DISCONTINUED | OUTPATIENT
Start: 2025-06-29 | End: 2025-06-29 | Stop reason: HOSPADM

## 2025-06-29 RX ORDER — MIDAZOLAM HYDROCHLORIDE 1 MG/ML
INJECTION INTRAMUSCULAR; INTRAVENOUS AS NEEDED
Status: DISCONTINUED | OUTPATIENT
Start: 2025-06-29 | End: 2025-06-29

## 2025-06-29 RX ORDER — FENTANYL CITRATE 50 UG/ML
INJECTION, SOLUTION INTRAMUSCULAR; INTRAVENOUS AS NEEDED
Status: DISCONTINUED | OUTPATIENT
Start: 2025-06-29 | End: 2025-06-29

## 2025-06-29 RX ORDER — PROPOFOL 10 MG/ML
INJECTION, EMULSION INTRAVENOUS AS NEEDED
Status: DISCONTINUED | OUTPATIENT
Start: 2025-06-29 | End: 2025-06-29

## 2025-06-29 RX ORDER — ESMOLOL HYDROCHLORIDE 10 MG/ML
INJECTION INTRAVENOUS AS NEEDED
Status: DISCONTINUED | OUTPATIENT
Start: 2025-06-29 | End: 2025-06-29

## 2025-06-29 RX ORDER — LIDOCAINE HYDROCHLORIDE AND EPINEPHRINE 10; 10 UG/ML; MG/ML
INJECTION, SOLUTION INFILTRATION; PERINEURAL AS NEEDED
Status: DISCONTINUED | OUTPATIENT
Start: 2025-06-29 | End: 2025-06-29 | Stop reason: HOSPADM

## 2025-06-29 RX ORDER — SODIUM CHLORIDE 0.9 G/100ML
INJECTION, SOLUTION IRRIGATION AS NEEDED
Status: DISCONTINUED | OUTPATIENT
Start: 2025-06-29 | End: 2025-06-29 | Stop reason: HOSPADM

## 2025-06-29 RX ORDER — CEFAZOLIN 1 G/1
INJECTION, POWDER, FOR SOLUTION INTRAVENOUS AS NEEDED
Status: DISCONTINUED | OUTPATIENT
Start: 2025-06-29 | End: 2025-06-29

## 2025-06-29 RX ORDER — LABETALOL HYDROCHLORIDE 5 MG/ML
5 INJECTION, SOLUTION INTRAVENOUS ONCE AS NEEDED
Status: DISCONTINUED | OUTPATIENT
Start: 2025-06-29 | End: 2025-06-29 | Stop reason: HOSPADM

## 2025-06-29 RX ORDER — OXYCODONE HYDROCHLORIDE 5 MG/1
10 TABLET ORAL EVERY 4 HOURS PRN
Status: DISCONTINUED | OUTPATIENT
Start: 2025-06-29 | End: 2025-06-29 | Stop reason: HOSPADM

## 2025-06-29 RX ADMIN — LIDOCAINE HYDROCHLORIDE 100 MG: 20 INJECTION INTRAVENOUS at 07:58

## 2025-06-29 RX ADMIN — DEXAMETHASONE SODIUM PHOSPHATE 10 MG: 4 INJECTION INTRA-ARTICULAR; INTRALESIONAL; INTRAMUSCULAR; INTRAVENOUS; SOFT TISSUE at 08:10

## 2025-06-29 RX ADMIN — OXYCODONE HYDROCHLORIDE 5 MG: 5 TABLET ORAL at 09:42

## 2025-06-29 RX ADMIN — PROPOFOL 100 MG: 10 INJECTION, EMULSION INTRAVENOUS at 07:59

## 2025-06-29 RX ADMIN — ONDANSETRON 4 MG: 2 INJECTION INTRAMUSCULAR; INTRAVENOUS at 08:10

## 2025-06-29 RX ADMIN — FENTANYL CITRATE 50 MCG: 50 INJECTION, SOLUTION INTRAMUSCULAR; INTRAVENOUS at 07:59

## 2025-06-29 RX ADMIN — ESMOLOL HYDROCHLORIDE 30 MG: 10 INJECTION, SOLUTION INTRAVENOUS at 08:36

## 2025-06-29 RX ADMIN — HYDROMORPHONE HYDROCHLORIDE 0.4 MG: 1 INJECTION, SOLUTION INTRAMUSCULAR; INTRAVENOUS; SUBCUTANEOUS at 09:34

## 2025-06-29 RX ADMIN — CEFAZOLIN 1 G: 330 INJECTION, POWDER, FOR SOLUTION INTRAMUSCULAR; INTRAVENOUS at 08:09

## 2025-06-29 RX ADMIN — ESMOLOL HYDROCHLORIDE 40 MG: 10 INJECTION, SOLUTION INTRAVENOUS at 08:49

## 2025-06-29 RX ADMIN — LIDOCAINE HYDROCHLORIDE 60 MG: 20 INJECTION INTRAVENOUS at 07:59

## 2025-06-29 RX ADMIN — ESMOLOL HYDROCHLORIDE 40 MG: 10 INJECTION, SOLUTION INTRAVENOUS at 08:05

## 2025-06-29 RX ADMIN — SODIUM CHLORIDE, SODIUM GLUCONATE, SODIUM ACETATE, POTASSIUM CHLORIDE AND MAGNESIUM CHLORIDE: 526; 502; 368; 37; 30 INJECTION, SOLUTION INTRAVENOUS at 07:48

## 2025-06-29 RX ADMIN — SUGAMMADEX 200 MG: 100 INJECTION, SOLUTION INTRAVENOUS at 08:52

## 2025-06-29 RX ADMIN — MIDAZOLAM HYDROCHLORIDE 1 MG: 2 INJECTION, SOLUTION INTRAMUSCULAR; INTRAVENOUS at 07:48

## 2025-06-29 RX ADMIN — ROCURONIUM BROMIDE 50 MG: 10 INJECTION, SOLUTION INTRAVENOUS at 07:59

## 2025-06-29 RX ADMIN — FENTANYL CITRATE 50 MCG: 50 INJECTION, SOLUTION INTRAMUSCULAR; INTRAVENOUS at 08:20

## 2025-06-29 RX ADMIN — PROPOFOL 30 MG: 10 INJECTION, EMULSION INTRAVENOUS at 08:41

## 2025-06-29 ASSESSMENT — PAIN SCALES - GENERAL
PAINLEVEL_OUTOF10: 0 - NO PAIN
PAINLEVEL_OUTOF10: 4
PAINLEVEL_OUTOF10: 7
PAINLEVEL_OUTOF10: 0 - NO PAIN
PAINLEVEL_OUTOF10: 0 - NO PAIN
PAINLEVEL_OUTOF10: 4
PAINLEVEL_OUTOF10: 0 - NO PAIN

## 2025-06-29 ASSESSMENT — PAIN - FUNCTIONAL ASSESSMENT
PAIN_FUNCTIONAL_ASSESSMENT: 0-10

## 2025-06-29 ASSESSMENT — ENCOUNTER SYMPTOMS
SORE THROAT: 0
FACIAL SWELLING: 0
TROUBLE SWALLOWING: 0
SHORTNESS OF BREATH: 0

## 2025-06-29 ASSESSMENT — COLUMBIA-SUICIDE SEVERITY RATING SCALE - C-SSRS
6. HAVE YOU EVER DONE ANYTHING, STARTED TO DO ANYTHING, OR PREPARED TO DO ANYTHING TO END YOUR LIFE?: NO
2. HAVE YOU ACTUALLY HAD ANY THOUGHTS OF KILLING YOURSELF?: NO
1. IN THE PAST MONTH, HAVE YOU WISHED YOU WERE DEAD OR WISHED YOU COULD GO TO SLEEP AND NOT WAKE UP?: NO

## 2025-06-29 ASSESSMENT — PAIN DESCRIPTION - DESCRIPTORS: DESCRIPTORS: ACHING

## 2025-06-29 NOTE — OP NOTE
ORIF, FRACTURE, MANDIBLE, EXTRACTION TEETH (2) (R) Operative Note     Date: 2025  OR Location: [facility] OR    Name: Gifty Shaw, : 1960, Age: 65 y.o., MRN: [id number], Sex: female    Diagnosis  Pre-op Diagnosis      * Fracture of condylar process of left mandible, initial encounter for closed fracture (Multi) [S02.612A]     * Fracture of condylar process of right mandible, initial encounter for closed fracture (Multi) [S02.611A]     * Closed fracture of symphysis of mandible, initial encounter (Multi) [S02.66XA] Post-op Diagnosis     * Fracture of condylar process of left mandible, initial encounter for closed fracture (Multi) [S02.612A]     * Fracture of condylar process of right mandible, initial encounter for closed fracture (Multi) [S02.611A]     * Closed fracture of symphysis of mandible, initial encounter (Multi) [S02.66XA]     Procedures  ORIF, FRACTURE, MANDIBLE, EXTRACTION TEETH (2)  24666 - WA OPTX COMP MANDIBULAR FX MLT APPR W/INT FIXATION      Surgeons      * Tim Wood - Primary    Resident/Fellow/Other Assistant:  Surgeons and Role:     * Chad Duff DDS - Resident - Assisting    Staff:   Fridaulator: Anoop Christina Person: Leila    Anesthesia Staff: Anesthesiologist: Helio Tovar DO  CRNA: MARITZA D eDios-CRNA  SRNA: Sergio Tellez    Procedure Summary  Anesthesia: General  ASA: II  Estimated Blood Loss: 5mL  Intra-op Medications:   Administrations occurring from 0745 to 1025 on 25:   Medication Name Total Dose   sodium chloride 0.9 % irrigation solution 1,000 mL   lidocaine-epinephrine (Xylocaine W/EPI) 1 %-1:100,000 injection 7 mL   ceFAZolin (Ancef) 1 gram/ 3 mL injection - reconstituted 330 mg/mL 1 g   dexAMETHasone (Decadron) 4 mg/mL IV Syringe 2 mL 10 mg   electrolyte-A (Plasmalyte-A) Cannot be calculated   esmolol (Brevibloc) 10 mcg/mL  IV bolus 110 mg   fentaNYL (Sublimaze) injection 50 mcg/mL 100 mcg   lidocaine (cardiac) injection 2% prefilled syringe  160 mg   midazolam PF (Versed) injection 1 mg/mL 1 mg   ondansetron (Zofran) 2 mg/mL injection 4 mg   propofol (Diprivan) injection 10 mg/mL 130 mg   rocuronium (ZeMuron) 50 mg/5 mL injection 50 mg   sugammadex (Bridion) 200 mg/2 mL injection 200 mg              Anesthesia Record               Intraprocedure I/O Totals          Intake    electrolyte-A (Plasmalyte-A) 800.00 mL    Total Intake 800 mL          Specimen: No specimens collected              Drains and/or Catheters: * None in log *    Tourniquet Times:         Implants:  Implants       Type Name Action Serial No.      Neuro Interventional Implant LIGATURE WIRE, BLUNT 160MM - [id number] Implanted       2.0 X 8MM MMF SCREW Implanted      Screw 2.0X12MM MMF SCREW Implanted      Screw 2.0X33MM LAG SCREW Implanted               Findings: Fractured teeth #29,30, mandibular symphysis fracture     Indications: Gifty Shaw is an 65 y.o. female who is having surgery for Fracture of condylar process of left mandible, initial encounter for closed fracture (Multi) [S02.612A]  Fracture of condylar process of right mandible, initial encounter for closed fracture (Multi) [S02.611A]  Closed fracture of symphysis of mandible, initial encounter (Multi) [S02.66XA].     The patient was seen in the preoperative area. The risks, benefits, complications, treatment options, non-operative alternatives, expected recovery and outcomes were discussed with the patient. The possibilities of reaction to medication, pulmonary aspiration, injury to surrounding structures, bleeding, recurrent infection, the need for additional procedures, failure to diagnose a condition, and creating a complication requiring transfusion or operation were discussed with the patient. The patient concurred with the proposed plan, giving informed consent.  The site of surgery was properly noted/marked if necessary per policy. The patient has been actively warmed in preoperative area. Preoperative  "antibiotics have been ordered and given within 1 hours of incision. Venous thrombosis prophylaxis have been ordered including bilateral sequential compression devices    Procedure Details:   The patient was greeted in the pre-op holding area, where all preoperative risks and complications were reviewed. Later, the patient was brought into the operating room by the anesthesia staff and was placed in the supine position. A \"Time out\" was performed to confirmed patient's identity and the procedure to be performed. The patient was then induced for general anesthesia and intubated with a nasal endotracheal tube. Following intubation, the nasal endotracheal tube was secured by the oral and maxillofacial surgery team. The patient was prepped and draped in standard oral and maxillofacial surgery fashion. A throat pack was placed. Approx. 10cc 1% lidocaine with 1:100K epi was administered via intraoral bilateral inferior alveolar nerve block, mental nerve block, and buccal infiltration at the planned intraoral incision sites. A preincision pause was performed. Teeth #29,30 were extracted via elevator and forceps technique as they were significantly fractured. IMF screws were placed, 2 in maxilla and 2 in mandible.        A midline intraoral vestibular incision was made using Bovie electrocautery and carried through the mentalis muscle to expose the anterior mandible. Subperiosteal dissection was performed bilaterally, and the bilateral mental nerves were clearly identified and carefully preserved throughout the procedure. The symphyseal fracture was exposed and cleared of hematoma and interposed soft tissue.  Fracture reduction was performed under direct visualization and held in place manually. For placement of the first lag screw, a 2.0 mm drill was used through its corresponding guide to drill through proximal segment only, followed by use of a 1.6 mm drill for the second cortex in lag fashion. The screw tract was " countersunk using the dedicated countersink  to ensure the screw head would sit flush with the outer cortex. A 33 mm Keyshawn lag screw was then inserted and tightened, achieving excellent interfragmentary compression. The second lag screw was placed in a similar manner superiorly, again using the 2.0 mm drill and guide, followed by the 1.6 mm drill for the far cortex, then countersinking, and finally insertion of a 33 mm lag screw. Both screws were confirmed to cross the fracture line appropriately with excellent reduction and rigid fixation. Occlusion remained stable after releasing intermaxillary fixation. The surgical field was irrigated with copious saline. Layered closure was performed, first reapproximating the mentalis muscle with 3-0 chromic gut sutures, followed by closure of the mucosa with 4-0 Monocryl.     The oral cavity was suctioned and throat pack removed. Care of the patient was then transferred over to the anesthesia team. The patient was emerged from anesthesia, extubated and was taken to PACU in stable condition.     Dr. Wood was present for all critical portions of the case.      Evidence of Infection: No   Complications:  None; patient tolerated the procedure well.    Disposition: PACU - hemodynamically stable.  Condition: stable             Attending Attestation: I was present and scrubbed for the entire procedure.    Tim Wood  Phone Number: [phone]

## 2025-06-29 NOTE — H&P
History Of Present Illness  Gifty Shaw is a 65 y.o. female presenting to ED after a fall. Per ems and the patient she took an ambien, drank alcohol and had an edible. She states she stood up and was walking when she fell over and struck her chin. Patient sustained fracture of anterior mandible and bilateral condylar heads.     Today, patient reports swelling has subsided. She denies any difficulty breathing trouble swallowing or managing secretions.         Past Medical History  Medical History[1]    Surgical History  Surgical History[2]     Social History  She reports that she has quit smoking. Her smoking use included cigarettes. She started smoking about 40 years ago. She has a 20.2 pack-year smoking history. She has never used smokeless tobacco. She reports current alcohol use of about 5.0 - 6.0 standard drinks of alcohol per week. She reports current drug use. Drug: Marijuana.    Allergies  Bupropion, Sulfa (sulfonamide antibiotics), and Sulfamethoxazole-trimethoprim    Review of Systems   HENT:  Positive for dental problem. Negative for facial swelling, sore throat and trouble swallowing.    Respiratory:  Negative for shortness of breath.         Physical Exam  HENT:      Head:      Comments: CNV CNVII intact and equal bilaterally. Mental laceration healing with no sutures. No extraoral swelling appreciated. Condylar heads non tender to palpation. No bony step off appreciate anteriorly.      Nose: Nose normal.      Mouth/Throat:      Mouth: Mucous membranes are moist.      Comments: FRAN 22 mm. Patient has dentition in good repair. FOM soft. Occlusion stable, bilateral posterior contacts. #29 TTP to palpation. No fluid collection present.   Eyes:      Conjunctiva/sclera: Conjunctivae normal.   Cardiovascular:      Comments: Warm and well perfused  Pulmonary:      Comments: Patient breathing comfortably on room air   Skin:     General: Skin is warm.   Neurological:      Mental Status: She is alert and  oriented to person, place, and time.          Last Recorded Vitals  There were no vitals taken for this visit.     Relevant Results  Interpreted By:  Arina Hammonds,   STUDY:  CT HEAD WO IV CONTRAST; CT FACIAL BONES WO IV CONTRAST; CT CERVICAL  SPINE WO IV CONTRAST; CT 3D RECONSTRUCTION;  6/14/2025 10:41 pm      INDICATION:  Signs/Symptoms:fall; Signs/Symptoms:trauma.      COMPARISON:  None.      ACCESSION NUMBER(S):  [id number]; [id number]; [id number]; [id number]      ORDERING CLINICIAN:  GLORIA AMADOR      TECHNIQUE:  Axial noncontrast images of the head. Axial noncontrast images of the  facial bones with coronal and sagittal reconstructed images. Axial  noncontrast images of the cervical spine with coronal and sagittal  reconstructed images. 3D reconstructions of the facial bones were  generated at a separate workstation and reviewed.      FINDINGS:  BRAIN PARENCHYMA: Chronic lacunar infarct versus dilated perivascular  space in the left basal ganglia. Gray-white matter interfaces are  preserved. No mass effect or midline shift.      HEMORRHAGE: No acute intracranial hemorrhage.  VENTRICLES and EXTRA-AXIAL SPACES: The ventricles and sulci are  within normal limits in size for brain volume. No abnormal extraaxial  fluid collection. EXTRACRANIAL SOFT TISSUES: Within normal limits.  CALVARIUM: No depressed calvarial fracture. No destructive osseous  lesion.      FACIAL BONES: Acute nondisplaced fracture of the mandible extending  between the left the right central incisor teeth. Acute, mildly  displaced and impacted fractures of the bilateral mandibular necks,  involving the mandibular condyles. SOFT TISSUES: Within normal limits.  PARANASAL SINUSES: No hemorrhage in the paranasal sinuses.  MASTOIDS: Within normal limits.  ORBITS: The globes, extraocular muscles and optic nerve sheath  complexes are symmetric. No retrobulbar hematoma.      ALIGNMENT: Normal.  VERTEBRAE: Age indeterminate fracture of the C4  spinous process  without significant displacement. SPINAL CANAL: Mild degenerative  disc changes. No critical spinal canal stenosis. PREVERTEBRAL SOFT  TISSUES: No prevertebral soft tissue swelling. LUNG APICES:  Postsurgical changes at the right lung apex.      OTHER FINDINGS: None.      IMPRESSION:  No acute intracranial abnormality.      Acute, mildly displaced and impacted fractures of the bilateral  mandibular necks, extending to the mandibular condyles. Acute  nondisplaced fracture of the mandibular symphysis.      Age indeterminate, nondisplaced fracture of the C4 spinous process.  Please correlate for point tenderness.  Assessment & Plan  Fracture of condylar process of left mandible, initial encounter for closed fracture (Multi)    Fracture of condylar process of right mandible, initial encounter for closed fracture (Multi)    Closed fracture of symphysis of body of mandible (Multi)    Patient is a 64 y/o F presenting with bilateral condylar fractures, nondisplaced anterior mandibular symphysis fracture requiring repair.     Plan is ORIF anterior mandible and possible placement into MMF in OR 6/29.       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

## 2025-06-29 NOTE — DISCHARGE INSTRUCTIONS
OMFS Discharge Instructions:    Procedure: Open reduction internal fixation of presymphyseal fracture of the mandible, extraction of fractured #29 and #30.   Surgeon: Dr. Tim Wood     INCISION CARE:  - Intra-oral Incision Care: To care for your intra-oral incisions, please swish and spit with 15 mLs of peridex solution 3-4 times daily until follow-up. Use swabs to perform oral care.     HYGIENE  - Do not brush your teeth, rinse your mouth or spit for the first 24 hours  - May brush your teeth gently starting on day 2 after surgery  - If prescribed Chlorhexidine rinse, gently rinse and spit the medication three times per day. Do not swallow the medication.    DIET  - Do not drink through a straw.  - Start first with clear liquids, and then gradually advance to soup and full liquids until directed by your doctor    MEDICATIONS  -  Take medications as prescribed.   - Pain medication should be taken only during the time that you feel significant discomfort. If the pain is severe, the prescription medication can be used. However, if only mild discomfort is experienced, try to use a less potent, over the counter medication such as Advil (ibuprofen and/or Tylenol (Acetaminophen). These can be taken in crushed tablets or in liquid form.  - Antibiotics: Please take antibiotics at the appropriate interval as described on the bottle. Be sure not to miss any doses and take the medicine until it is gone.  - Rinse 2 to 3 times a day with Peridex/Chx mouth rinse after meals.     FIRST DAY AFTER SURGERY  - Hygiene: Return to your normal brushing routine, being very careful around surgery site(s)  - Avoid using full-strength over the counter mouthwashes for 2 weeks.  - Begin using a chlorhexidine or warm salt-water rinse (1/4 teaspoon salt in a glass of warm water) after meals for the first week.  - Pain and swelling is normal and expected, and may last for 10-14 days. Don´t be alarmed if the third day is the  worst.    ACTIVITY  - Do not lift more than 15 pounds until directed by your doctor  - Do not perform intense physical activity until directed by your doctor  - Do not drive while taking narcotics  - Do not smoke    BLEEDING  - Change gauze as directed every 20-30 minutes until active bleeding has subsided (usually 2-3 hours).  - Make sure to apply good pressure to the gauze by biting down  - You may remove gauze to begin drinking, but return fresh gauze to surgery sites if bleeding is still present.  - It is normal to experience light bleeding or oozing for up to 24 hours. If there is severe bleeding follow instructions at the bottom of the form under ``Excessive Bleeding´´    PLEASE REPORT ANY OF THE FOLLOWING TO OUR TEAM:  - Sudden or excessive bleeding, swelling, or bruising.  - Any itching, rash, or adverse reaction to medication.  - Fever over 100 degrees Fahrenheit.  - Drainage or discharge from the incision sites (other than blood).  - Any injury to the face over the next 6 weeks.    Follow up will be scheduled at outpatient office with Dr. Wood.    For any emergency or after hours questions do not hesitate to contact the resident on call. You may call [phone] for the hospital  and ask for the ``Oral Surgeon On Call´´.

## 2025-06-29 NOTE — ANESTHESIA PREPROCEDURE EVALUATION
Patient: Gifty Shaw    Procedure Information       Date/Time: 25 0745    Procedure: ORIF, FRACTURE, MANDIBLE (Right)    Location: [facility] OR 06 /  [facility] OR    Surgeons: Tim Wood DMD            Relevant Problems   Cardiac   (+) HTN (hypertension)   (+) Hypercholesterolemia      Pulmonary   (+) COPD, mild (Multi)   (+) Lung nodules      Neuro   (+) Anxiety disorder, transient organic   (+) Depression      Skin   (+) Eczema       Clinical information reviewed:   Tobacco  Allergies  Meds   Med Hx  Surg Hx  OB Status  Fam Hx  Soc   Hx        NPO Detail:  NPO/Void Status  Carbohydrate Drink Given Prior to Surgery? : N  Date of Last Liquid: 25  Time of Last Liquid:   Date of Last Solid: 25  Time of Last Solid:   Last Intake Type: Clear fluids; Light meal  Time of Last Void: 430         Physical Exam    Airway  Mallampati: II  TM distance: >3 FB  Neck ROM: full  Mouth openin finger width     Cardiovascular - normal exam   Dental    Pulmonary - normal exam   Abdominal      Other findings: Mouth opening limited by pain          Anesthesia Plan    History of general anesthesia?: yes  History of complications of general anesthesia?: no    ASA 2     general     intravenous induction   Anesthetic plan and risks discussed with patient.  Use of blood products discussed with patient who.    Plan discussed with CRNA and attending.

## 2025-06-29 NOTE — ANESTHESIA PROCEDURE NOTES
Airway  Date/Time: 6/29/2025 8:04 AM  Reason: elective    Airway not difficult    Staffing  Performed: SRNA   Authorized by: MARITZA De Dios-CRNA    Performed by: Sergio Tellez  Patient location during procedure: OR    Patient Condition  Indications for airway management: anesthesia  Patient position: sniffing  Sedation level: deep     Final Airway Details   Preoxygenated: yes  Final airway type: endotracheal airway  Successful airway: ETT  Cuffed: yes   Successful intubation technique: video laryngoscopy  Endotracheal tube insertion site: right naris  Blade: Kenyetta  Blade size: #3  ETT size (mm): 6.5  Cormack-Lehane Classification: grade IIa - partial view of glottis  Placement verified by: chest auscultation and capnometry   Measured from: nares  ETT to nares (cm): 25  Number of attempts at approach: 1

## 2025-06-29 NOTE — ANESTHESIA POSTPROCEDURE EVALUATION
Patient: Gifty Shaw    Procedure Summary       Date: 06/29/25 Room / Location: [facility] OR 06 / [facility] OR    Anesthesia Start: 0747 Anesthesia Stop: 0915    Procedure: ORIF, FRACTURE, MANDIBLE, EXTRACTION TEETH (2) (Right) Diagnosis:       Fracture of condylar process of left mandible, initial encounter for closed fracture (Multi)      Fracture of condylar process of right mandible, initial encounter for closed fracture (Multi)      Closed fracture of symphysis of mandible, initial encounter (Multi)      (Fracture of condylar process of left mandible, initial encounter for closed fracture (Multi) [S02.612A])      (Fracture of condylar process of right mandible, initial encounter for closed fracture (Multi) [S02.611A])      (Closed fracture of symphysis of mandible, initial encounter (Multi) [S02.66XA])    Surgeons: Tim Wood DMD Responsible Provider: Helio Tovar DO    Anesthesia Type: general ASA Status: 2            Anesthesia Type: general    Vitals Value Taken Time   /88 06/29/25 09:15   Temp 36 06/29/25 09:30   Pulse 90 06/29/25 09:25   Resp 13 06/29/25 09:25   SpO2 96 % 06/29/25 09:25   Vitals shown include unfiled device data.    Anesthesia Post Evaluation    Patient location during evaluation: PACU  Patient participation: complete - patient participated  Level of consciousness: awake and alert  Pain management: adequate  Airway patency: patent  Cardiovascular status: acceptable  Respiratory status: acceptable  Hydration status: acceptable  Postoperative Nausea and Vomiting: none        No notable events documented.

## 2025-07-15 ENCOUNTER — APPOINTMENT (OUTPATIENT)
Dept: SURGERY | Facility: CLINIC | Age: 65
End: 2025-07-15
Payer: COMMERCIAL

## 2025-08-05 ENCOUNTER — OFFICE VISIT (OUTPATIENT)
Dept: SURGERY | Facility: CLINIC | Age: 65
End: 2025-08-05
Payer: COMMERCIAL

## 2025-08-05 ENCOUNTER — HOSPITAL ENCOUNTER (OUTPATIENT)
Dept: RADIOLOGY | Facility: CLINIC | Age: 65
Discharge: HOME | End: 2025-08-05
Payer: COMMERCIAL

## 2025-08-05 VITALS
HEIGHT: 61 IN | TEMPERATURE: 97.4 F | BODY MASS INDEX: 17.75 KG/M2 | SYSTOLIC BLOOD PRESSURE: 147 MMHG | DIASTOLIC BLOOD PRESSURE: 83 MMHG | WEIGHT: 94 LBS | HEART RATE: 88 BPM | OXYGEN SATURATION: 98 %

## 2025-08-05 DIAGNOSIS — C34.11 MALIGNANT NEOPLASM OF UPPER LOBE OF RIGHT LUNG (MULTI): ICD-10-CM

## 2025-08-05 PROCEDURE — 1157F ADVNC CARE PLAN IN RCRD: CPT | Performed by: STUDENT IN AN ORGANIZED HEALTH CARE EDUCATION/TRAINING PROGRAM

## 2025-08-05 PROCEDURE — 3008F BODY MASS INDEX DOCD: CPT | Performed by: STUDENT IN AN ORGANIZED HEALTH CARE EDUCATION/TRAINING PROGRAM

## 2025-08-05 PROCEDURE — 1159F MED LIST DOCD IN RCRD: CPT | Performed by: STUDENT IN AN ORGANIZED HEALTH CARE EDUCATION/TRAINING PROGRAM

## 2025-08-05 PROCEDURE — 99214 OFFICE O/P EST MOD 30 MIN: CPT | Performed by: STUDENT IN AN ORGANIZED HEALTH CARE EDUCATION/TRAINING PROGRAM

## 2025-08-05 PROCEDURE — 3079F DIAST BP 80-89 MM HG: CPT | Performed by: STUDENT IN AN ORGANIZED HEALTH CARE EDUCATION/TRAINING PROGRAM

## 2025-08-05 PROCEDURE — 3077F SYST BP >= 140 MM HG: CPT | Performed by: STUDENT IN AN ORGANIZED HEALTH CARE EDUCATION/TRAINING PROGRAM

## 2025-08-05 PROCEDURE — 71250 CT THORAX DX C-: CPT

## 2025-08-05 PROCEDURE — 71250 CT THORAX DX C-: CPT | Performed by: RADIOLOGY

## 2025-08-05 NOTE — PATIENT INSTRUCTIONS
"Follow up in 6 months with CT chest prior. See appointment below in \"What's Next\".  Call our office with any questions. [phone]    "

## 2025-08-11 NOTE — PROGRESS NOTES
Chief complaint:  Surveillance lung cancer visit    History Of Present Illness  Gifty Shaw is a 65 y.o. female, former smoker, now s/p a  RUL nodule now s/p Rt VATS upper lobe wedge + MLND for a pT1bN0 acinar adenocarcinoma on 2/3/24.     Since her last visit she unfortunately fell and fractured her jaw and has been on a liquid diet only with several lb weight loss. She is thankfully starting to have less pain and sees the physician next week to talk about diet advancement.    From a respiratory standpoint is doing well, denies SOB, cough, chest pain, or other. Her energy level is good overall.     By way of review: I saw patient in Jan 2025 with a right apical spiculated subsolid lesion. Patient was referred by Dr. Colon, PCP. Patient underwent LDCT in Dec 2024, this showed bilateral apical scarring vs lesions but with a prominent right apical subsolid 1.6cm lesion concerning for malignancy/adenocarcinoma spectral lesion. She denies any new cough, congestion, SOB, or unexplained weight loss. Has unfortunately had diarrhea a couple weeks but no other physical complaints.     Retired .     No history of MI or CVA. Could walk a mile or climb 2 flights of stairs: yes     Past Medical History  She has a past medical history of COPD (chronic obstructive pulmonary disease) (Multi), Eczema, Encounter for gynecological examination (general) (routine) without abnormal findings (05/10/2014), Encounter for screening for malignant neoplasm of vagina, Essential (primary) hypertension (08/01/2013), Hyperlipidemia, Other conditions influencing health status, Personal history of other endocrine, nutritional and metabolic disease, Personal history of other medical treatment, and Personal history of other mental and behavioral disorders (08/20/2013).    She has no past medical history of Personal history of irradiation.    Surgical History  She has a past surgical history that includes Breast biopsy (Bilateral,  1995); Colonoscopy; Lung removal, partial (Right, 02/03/2025); and No past surgeries.     Social History  She reports that she has been smoking cigarettes. She started smoking about 40 years ago. She has a 20.3 pack-year smoking history. She has been exposed to tobacco smoke. She has never used smokeless tobacco. She reports current alcohol use of about 6.0 standard drinks of alcohol per week. She reports that she does not currently use drugs after having used the following drugs: Marijuana.    Family History  Family history of cancer: No  Family History   Problem Relation Name Age of Onset    Other (cardiac disorder) Father Oscar     Cancer Father Oscar     Colon cancer Other Me     Hypertension Other Me     Hypertension Mother Manjula         Medications    Current Outpatient Medications:     amLODIPine (Norvasc) 5 mg tablet, Take 1 tablet (5 mg) by mouth once daily., Disp: 90 tablet, Rfl: 3    atorvastatin (Lipitor) 10 mg tablet, Take 1 tablet (10 mg) by mouth once daily., Disp: 90 tablet, Rfl: 3    wjo-[id number]-[id number]-zinc--fred-bor (Caltrate 600-D Plus Minerals) 600 mg calcium- 800 unit-50 mg tablet, Take by mouth., Disp: , Rfl:     escitalopram (Lexapro) 20 mg tablet, Take 2 tablets (40 mg) by mouth once daily., Disp: 180 tablet, Rfl: 3    estradiol (Estrace) 0.01 % (0.1 mg/gram) vaginal cream, Insert 0.125 Applicatorfuls (0.5 g) into the vagina 3 times a week. Apply pea sized amount to vaginal opening every Monday, Wednesday, and Friday evening, Disp: 42.5 g, Rfl: 3    ketoconazole (NIZOral) 2 % cream, Apply twice daily to affected areas of face, Disp: 60 g, Rfl: 11    oxyCODONE (Roxicodone) 5 mg immediate release tablet, Take 1 tablet (5 mg) by mouth every 6 hours if needed for severe pain (7 - 10) for up to 9 doses., Disp: 9 tablet, Rfl: 0    traZODone (Desyrel) 100 mg tablet, TAKE 2 TABLETS ONCE DAILY  AT BEDTIME, Disp: 180 tablet, Rfl: 3    tretinoin (Retin-A) 0.025 % cream, Apply 1 Application topically  "once daily at bedtime. Start 1-2 nights per week 1-2 weeks, inc to every other night, then every night as tolerated, Disp: 45 g, Rfl: 11    triamterene-hydrochlorothiazid (Maxzide-25) 37.5-25 mg tablet, Take 1 tablet by mouth once daily., Disp: 90 tablet, Rfl: 3    zolpidem (Ambien) 10 mg tablet, Take 1 tablet (10 mg) by mouth as needed at bedtime for sleep., Disp: 30 tablet, Rfl: 0    Allergies  Bupropion, Sulfa (sulfonamide antibiotics), and Sulfamethoxazole-trimethoprim    Review of Systems:  Review of Systems   Constitutional: No fevers, chills, unexpected weight change  HENT: No sore throat, congestion, or nasal drainage  Eyes: No visual changes or eye itching  Respiratory: see HPI. No cough, worsening dyspnea, wheezing  Cardiac: No chest pain, palpitations, or lower extremity edema  Gastrointestinal: No nausea, vomiting, diarrhea. No abdominal pain  Genitourinary: No dysuria or hematuria  Musculoskeletal: No back pain. No significant myalgias or arthralgias  Neurologic: No headaches, dizziness, or seizures.  Hematologic: No easy bleeding or bruising.  Psychiatric: No anxiety or depression.    Physical Exam:  Physical Exam  /83   Pulse 88   Temp 36.3 °C (97.4 °F)   Ht (!) 1.549 m (5' 1\")   Wt (!) 42.6 kg (94 lb)   LMP 01/01/2004 (Approximate)   SpO2 98%   BMI 17.76 kg/m²   Constitutional:       General: Patient is not in acute distress.     Appearance: Normal appearance; not ill-appearing.   HENT:      Head: Normocephalic.      Nose: No congestion or rhinorrhea.   Cardiovascular:      Rate and Rhythm: Normal rate and regular rhythm.      Pulses: Normal pulses.   Pulmonary:      Effort: Pulmonary effort is normal. No respiratory distress.  No conversational dyspnea.      Breath sounds: No stridor. No wheezing.   Abdominal:      General: There is no distension.      Palpations: Abdomen is soft.      Tenderness: There is no abdominal tenderness.   Musculoskeletal:         General: No swelling, " tenderness or deformity. Normal range of motion.      Cervical back: Normal range of motion. No rigidity.   Lymphadenopathy:      Cervical: No cervical adenopathy.   Skin:     General: Skin is warm and dry.   Neurological:      General: No focal deficit present.      Mental Status: Patient is alert and oriented to person, place, and time.   Psychiatric:         Mood and Affect: Mood normal.     Relevant Results    Pathology:  Surgical Pathology Exam: [id number]  Order: [id number]   Collected 2/3/2025 10:33       Status: Final result       Visible to patient: Yes (not seen)       Dx: Lung nodules    0 Result Notes       Component  Resulting Agency   FINAL DIAGNOSIS      A.  LUNG, RIGHT UPPER LOBE, WEDGE RESECTION:  - Adenocarcinoma, acinar and lepidic pattern (1.6 cm), involving lung parenchyma, see synoptic report and note.  - Margins are are negative for carcinoma.   - One lymph node with no evidence of carcinoma (0/1).      NOTE: By immunohistochemical staining, tumor cells are positive for TTF-1. PD-L1 immunostain results are reported below. Molecular studies will be ordered and results will be reported in an addendum. Multiple deeper levels of A3 are reviewed to evaluate the pleura.     B.  LYMPH NODE, LEVEL 7, EXCISION:  - One lymph node with no evidence of carcinoma (0/1).      C.  LYMPH NODE, LEVEL 10, EXCISION:  - One lymph node with no evidence of carcinoma (0/1).      D.  LYMPH NODE, LEVEL 11, EXCISION:  - One lymph node with no evidence of carcinoma (0/1).      E.  LYMPH NODE, LEVEL 4R, EXCISION:  - One lymph node with no evidence of carcinoma (0/1).      celso   Electronically signed by Angela Kelsey DO on 2/18/2025 at 0913      [facility]   By the signature on this report, the individual or group listed as making the Final Interpretation/Diagnosis certifies that they have reviewed this case.    Comment  [facility]   PD-L1 22C3  by Immunohistochemistry with Interpretation, pembrolizumab  (KEYTRUDA)     Block used:   A3     Interpretation: High Expression     Tumor Proportion Score (TPS): 80%        Intended use:  PD-L1 22C3 by IHC with Interpretation is a qualitative immunohistochemical assay using Monoclonal Mouse Anti-PD-L1, Clone 22C3 intended for use in the detection of PD-L1 protein in formalin-fixed, paraffin-embedded (FFPE) non-small cell lung cancer (NSCLC) tissue using the Optiview detection on a [place] BenchMark Ultra. The specimen submitted for testing should contain at least 100 viable tumor cells to be considered adequate for evaluation. This assay is indicated as an aid in identifying NSCLC patients for treatment with pembrolizumab (KEYTRUDA).     Methodology:  PD-L1 protein expression is determined by using Tumor Proportion Score (TPS), which is the percentage of viable tumor cells showing partial or complete membrane staining at any intensity. In the clinical setting of first-line therapy (treatment-naïve patients), the specimen is considered PD-L1 positive if the TPS is equal to or greater than 50 percent. In the setting of second-line therapy, the specimen is considered positive if the TPS is equal to or greater than 1 percent.     Reference Range:  High Expression >=50% TPS  Low Expression 1-49% TPS  No Expression <1% TPS     This assay is validated and FDA-approved for lung cancer specimens only. For all other specimen types, results should be interpreted with caution and within the appropriate clinical context. The use of this assay on decalcified tissues has not been validated and is not recommended.   One or more of the reagents used to perform assays on this specimen MAY have contained components considered to be Laboratory Developed Tests (LDT).  LDT's have not been cleared or approved by the U.S. Food and Drug Administration.  These assays/tests were developed and their performance characteristics determined by the Department of Pathology Immunohistochemistry Lab at [facility]. The FDA does not require this test to go through premarket FDA review. This test is used for clinical purposes. It should not be regarded as investigational or for research. This laboratory is certified under the Clinical Laboratory Improvement Amendments (CLIA) as qualified to perform high complexity clinical laboratory testing.  The assays/tests were performed with appropriate positive and negative controls which stained appropriately.    Case Summary Report   LUNG   8th Edition - Protocol posted: 9/21/2022LUNG - All Specimens  SPECIMEN   Procedure  Wedge resection   Specimen Laterality  Right   TUMOR   Tumor Focality  Single focus   Tumor Site  Upper lobe of lung   Tumor Size     Total Tumor Size (size of entire tumor)  Greatest Dimension (Centimeters): 1.6 cm   Size of Invasive Component  Greatest Dimension (Centimeters): 1.3 cm   Histologic Type  Invasive acinar adenocarcinoma   Histologic Patterns Present  Acinar: 70     Papillary: 10     Lepidic: 20   Histologic Grade  G2, moderately differentiated   Spread Through Air Spaces (CHRISTY)  Not identified   Visceral Pleura Invasion  Not identified   Direct Invasion of Adjacent Structures  Not applicable (no adjacent structures present)   Treatment Effect  No known presurgical therapy   Lymphovascular Invasion  Not identified   MARGINS   Margin Status for Invasive Carcinoma  All margins negative for invasive carcinoma   Closest Margin(s) to Invasive Carcinoma  Parenchymal   Distance from Invasive Carcinoma to Closest Margin  1.0 cm   Margin Status for Non-Invasive Tumor  All margins negative for non-invasive tumor   REGIONAL LYMPH NODES   Lymph Node(s) from Prior Procedures  No known prior lymph node sampling performed   Regional Lymph Node Status  All regional lymph nodes negative for tumor   Number of Lymph Nodes Examined  5   [place] Site(s) Examined  4R: Lower paratracheal     10R: Hilar     11R: Interlobar     13R: Segmental     7: Subcarinal  "  PATHOLOGIC STAGE CLASSIFICATION (pTNM, AJCC 8th Edition)   Reporting of pT, pN, and (when applicable) pM categories is based on information available to the pathologist at the time the report is issued. As per the AJCC (Chapter 1, 8th Ed.) it is the managing physician’s responsibility to establish the final pathologic stage based upon all pertinent information, including but potentially not limited to this pathology report.   pT Category  pT1b   pN Category  pN0   ADDITIONAL FINDINGS   Additional Findings  Metaplasia: osseous       Emphysema     Respiratory bronchiolitis             Imaging:    Pulmonary Functions Testing Results:    No results found for: \"FEV1\", \"FVC\", \"YPN1HEP\", \"TLC\", \"DLCO\"    CT chest personally reviewed     Assessment/Plan   Problem List Items Addressed This Visit    None  Visit Diagnoses         Codes      Malignant neoplasm of upper lobe of right lung (Multi)     C34.11    Relevant Orders    CT chest wo IV contrast            Ms. Shaw is a pleasant 65 year old female with a right apical 1.6cm subsolid lung nodule now s/p Rt VATS upper lobe wedge + MLND for a pT1bN0 acinar adenocarcinoma on 2/3/24. She is here today for her first surveillance CT chest which shows KVNG. We will plan to see her back in 6 months with repeat imaging.     Marlena Landin, DO  Thoracic & Esophageal Surgery     "

## 2025-08-26 ENCOUNTER — OFFICE VISIT (OUTPATIENT)
Dept: ORTHOPEDIC SURGERY | Facility: HOSPITAL | Age: 65
End: 2025-08-26
Payer: COMMERCIAL

## 2025-08-26 ENCOUNTER — APPOINTMENT (OUTPATIENT)
Dept: ORTHOPEDIC SURGERY | Facility: HOSPITAL | Age: 65
End: 2025-08-26
Payer: COMMERCIAL

## 2025-08-26 ENCOUNTER — OFFICE VISIT (OUTPATIENT)
Dept: URGENT CARE | Age: 65
End: 2025-08-26
Payer: COMMERCIAL

## 2025-08-26 ENCOUNTER — HOSPITAL ENCOUNTER (OUTPATIENT)
Dept: RADIOLOGY | Facility: CLINIC | Age: 65
Discharge: HOME | End: 2025-08-26
Payer: COMMERCIAL

## 2025-08-26 VITALS
WEIGHT: 93 LBS | HEART RATE: 82 BPM | SYSTOLIC BLOOD PRESSURE: 145 MMHG | BODY MASS INDEX: 17.57 KG/M2 | DIASTOLIC BLOOD PRESSURE: 84 MMHG | OXYGEN SATURATION: 97 % | RESPIRATION RATE: 16 BRPM

## 2025-08-26 DIAGNOSIS — S52.531A CLOSED COLLES' FRACTURE OF RIGHT RADIUS, INITIAL ENCOUNTER: ICD-10-CM

## 2025-08-26 DIAGNOSIS — S62.101A CLOSED FRACTURE OF RIGHT WRIST, INITIAL ENCOUNTER: ICD-10-CM

## 2025-08-26 DIAGNOSIS — S62.101A CLOSED FRACTURE OF RIGHT WRIST, INITIAL ENCOUNTER: Primary | ICD-10-CM

## 2025-08-26 DIAGNOSIS — S52.614A CLOSED NONDISPLACED FRACTURE OF STYLOID PROCESS OF RIGHT ULNA, INITIAL ENCOUNTER: ICD-10-CM

## 2025-08-26 DIAGNOSIS — S52.501A CLOSED TRAUMATIC DISPLACED FRACTURE OF DISTAL END OF RIGHT RADIUS, INITIAL ENCOUNTER: Primary | ICD-10-CM

## 2025-08-26 PROCEDURE — 73110 X-RAY EXAM OF WRIST: CPT | Mod: RT

## 2025-08-26 PROCEDURE — 73110 X-RAY EXAM OF WRIST: CPT | Mod: RIGHT SIDE | Performed by: RADIOLOGY

## 2025-08-26 PROCEDURE — L3908 WHO COCK-UP NONMOLDE PRE OTS: HCPCS | Performed by: SPECIALIST

## 2025-08-26 PROCEDURE — 99204 OFFICE O/P NEW MOD 45 MIN: CPT | Performed by: SPECIALIST

## 2025-08-26 PROCEDURE — 99203 OFFICE O/P NEW LOW 30 MIN: CPT

## 2025-08-26 PROCEDURE — 3079F DIAST BP 80-89 MM HG: CPT | Performed by: SPECIALIST

## 2025-08-26 PROCEDURE — 99204 OFFICE O/P NEW MOD 45 MIN: CPT | Performed by: STUDENT IN AN ORGANIZED HEALTH CARE EDUCATION/TRAINING PROGRAM

## 2025-08-26 PROCEDURE — 1125F AMNT PAIN NOTED PAIN PRSNT: CPT | Performed by: SPECIALIST

## 2025-08-26 PROCEDURE — 1159F MED LIST DOCD IN RCRD: CPT | Performed by: SPECIALIST

## 2025-08-26 PROCEDURE — 3077F SYST BP >= 140 MM HG: CPT | Performed by: SPECIALIST

## 2025-08-26 PROCEDURE — A4565 SLINGS: HCPCS | Performed by: SPECIALIST

## 2025-08-26 ASSESSMENT — ENCOUNTER SYMPTOMS
ARTHRALGIAS: 1
CONSTITUTIONAL NEGATIVE: 1

## 2025-08-26 ASSESSMENT — PAIN SCALES - GENERAL: PAINLEVEL_OUTOF10: 6

## 2025-08-27 ENCOUNTER — APPOINTMENT (OUTPATIENT)
Dept: ORTHOPEDIC SURGERY | Facility: HOSPITAL | Age: 65
End: 2025-08-27
Payer: COMMERCIAL

## 2025-08-28 DIAGNOSIS — S52.501A CLOSED FRACTURE OF RIGHT DISTAL RADIUS AND ULNA, INITIAL ENCOUNTER: ICD-10-CM

## 2025-08-28 DIAGNOSIS — S52.601A CLOSED FRACTURE OF RIGHT DISTAL RADIUS AND ULNA, INITIAL ENCOUNTER: ICD-10-CM

## 2025-08-29 ENCOUNTER — HOSPITAL ENCOUNTER (OUTPATIENT)
Facility: CLINIC | Age: 65
Setting detail: OUTPATIENT SURGERY
Discharge: HOME | End: 2025-08-29
Attending: STUDENT IN AN ORGANIZED HEALTH CARE EDUCATION/TRAINING PROGRAM | Admitting: STUDENT IN AN ORGANIZED HEALTH CARE EDUCATION/TRAINING PROGRAM
Payer: COMMERCIAL

## 2025-08-29 ENCOUNTER — ANESTHESIA (OUTPATIENT)
Dept: OPERATING ROOM | Facility: CLINIC | Age: 65
End: 2025-08-29
Payer: COMMERCIAL

## 2025-08-29 ENCOUNTER — ANESTHESIA EVENT (OUTPATIENT)
Dept: OPERATING ROOM | Facility: CLINIC | Age: 65
End: 2025-08-29
Payer: COMMERCIAL

## 2025-08-29 PROCEDURE — 2500000004 HC RX 250 GENERAL PHARMACY W/ HCPCS (ALT 636 FOR OP/ED): Performed by: REGISTERED NURSE

## 2025-08-29 PROCEDURE — 2500000005 HC RX 250 GENERAL PHARMACY W/O HCPCS: Performed by: REGISTERED NURSE

## 2025-08-29 RX ORDER — ONDANSETRON HYDROCHLORIDE 2 MG/ML
INJECTION, SOLUTION INTRAVENOUS AS NEEDED
Status: DISCONTINUED | OUTPATIENT
Start: 2025-08-29 | End: 2025-08-29

## 2025-08-29 RX ORDER — NORETHINDRONE AND ETHINYL ESTRADIOL 0.5-0.035
KIT ORAL AS NEEDED
Status: DISCONTINUED | OUTPATIENT
Start: 2025-08-29 | End: 2025-08-29

## 2025-08-29 RX ORDER — PHENYLEPHRINE HCL IN 0.9% NACL 0.4MG/10ML
SYRINGE (ML) INTRAVENOUS AS NEEDED
Status: DISCONTINUED | OUTPATIENT
Start: 2025-08-29 | End: 2025-08-29

## 2025-08-29 RX ORDER — PROPOFOL 10 MG/ML
INJECTION, EMULSION INTRAVENOUS AS NEEDED
Status: DISCONTINUED | OUTPATIENT
Start: 2025-08-29 | End: 2025-08-29

## 2025-08-29 RX ORDER — ROCURONIUM BROMIDE 10 MG/ML
INJECTION, SOLUTION INTRAVENOUS AS NEEDED
Status: DISCONTINUED | OUTPATIENT
Start: 2025-08-29 | End: 2025-08-29

## 2025-08-29 RX ORDER — FENTANYL CITRATE 50 UG/ML
INJECTION, SOLUTION INTRAMUSCULAR; INTRAVENOUS AS NEEDED
Status: DISCONTINUED | OUTPATIENT
Start: 2025-08-29 | End: 2025-08-29

## 2025-08-29 RX ORDER — LIDOCAINE HCL/PF 100 MG/5ML
SYRINGE (ML) INTRAVENOUS AS NEEDED
Status: DISCONTINUED | OUTPATIENT
Start: 2025-08-29 | End: 2025-08-29

## 2025-08-29 RX ORDER — MIDAZOLAM HYDROCHLORIDE 1 MG/ML
INJECTION, SOLUTION INTRAMUSCULAR; INTRAVENOUS AS NEEDED
Status: DISCONTINUED | OUTPATIENT
Start: 2025-08-29 | End: 2025-08-29

## 2025-08-29 RX ORDER — KETOROLAC TROMETHAMINE 30 MG/ML
INJECTION, SOLUTION INTRAMUSCULAR; INTRAVENOUS AS NEEDED
Status: DISCONTINUED | OUTPATIENT
Start: 2025-08-29 | End: 2025-08-29

## 2025-08-29 RX ORDER — HYDROMORPHONE HYDROCHLORIDE 1 MG/ML
INJECTION, SOLUTION INTRAMUSCULAR; INTRAVENOUS; SUBCUTANEOUS AS NEEDED
Status: DISCONTINUED | OUTPATIENT
Start: 2025-08-29 | End: 2025-08-29

## 2025-08-29 RX ORDER — SUCCINYLCHOLINE CHLORIDE 20 MG/ML
INJECTION INTRAMUSCULAR; INTRAVENOUS AS NEEDED
Status: DISCONTINUED | OUTPATIENT
Start: 2025-08-29 | End: 2025-08-29

## 2025-08-29 RX ORDER — CEFAZOLIN 1 G/1
INJECTION, POWDER, FOR SOLUTION INTRAVENOUS AS NEEDED
Status: DISCONTINUED | OUTPATIENT
Start: 2025-08-29 | End: 2025-08-29

## 2025-08-29 RX ORDER — ACETAMINOPHEN 10 MG/ML
INJECTION, SOLUTION INTRAVENOUS AS NEEDED
Status: DISCONTINUED | OUTPATIENT
Start: 2025-08-29 | End: 2025-08-29

## 2025-08-29 RX ADMIN — ONDANSETRON 4 MG: 2 INJECTION, SOLUTION INTRAMUSCULAR; INTRAVENOUS at 10:38

## 2025-08-29 RX ADMIN — CEFAZOLIN 2 G: 1 INJECTION, POWDER, FOR SOLUTION INTRAMUSCULAR; INTRAVENOUS at 09:01

## 2025-08-29 RX ADMIN — SUCCINYLCHOLINE CHLORIDE 100 MG: 20 INJECTION, SOLUTION INTRAMUSCULAR; INTRAVENOUS at 08:56

## 2025-08-29 RX ADMIN — EPHEDRINE SULFATE 5 MG: 50 INJECTION INTRAVENOUS at 09:47

## 2025-08-29 RX ADMIN — KETOROLAC TROMETHAMINE 15 MG: 30 INJECTION, SOLUTION INTRAMUSCULAR; INTRAVENOUS at 10:38

## 2025-08-29 RX ADMIN — Medication 80 MCG: at 09:32

## 2025-08-29 RX ADMIN — Medication 120 MCG: at 09:36

## 2025-08-29 RX ADMIN — FENTANYL CITRATE 50 MCG: 50 INJECTION, SOLUTION INTRAMUSCULAR; INTRAVENOUS at 09:08

## 2025-08-29 RX ADMIN — MIDAZOLAM 2 MG: 1 INJECTION INTRAMUSCULAR; INTRAVENOUS at 08:49

## 2025-08-29 RX ADMIN — FENTANYL CITRATE 50 MCG: 50 INJECTION, SOLUTION INTRAMUSCULAR; INTRAVENOUS at 08:55

## 2025-08-29 RX ADMIN — SUGAMMADEX 100 MG: 100 INJECTION, SOLUTION INTRAVENOUS at 10:53

## 2025-08-29 RX ADMIN — Medication 120 MCG: at 09:39

## 2025-08-29 RX ADMIN — EPHEDRINE SULFATE 10 MG: 50 INJECTION INTRAVENOUS at 10:39

## 2025-08-29 RX ADMIN — DEXAMETHASONE SODIUM PHOSPHATE 4 MG: 4 INJECTION, SOLUTION INTRAMUSCULAR; INTRAVENOUS at 09:01

## 2025-08-29 RX ADMIN — ACETAMINOPHEN 650 MG: 10 INJECTION, SOLUTION INTRAVENOUS at 09:09

## 2025-08-29 RX ADMIN — ROCURONIUM BROMIDE 30 MG: 10 INJECTION, SOLUTION INTRAVENOUS at 08:58

## 2025-08-29 RX ADMIN — SODIUM CHLORIDE, POTASSIUM CHLORIDE, SODIUM LACTATE AND CALCIUM CHLORIDE: 600; 310; 30; 20 INJECTION, SOLUTION INTRAVENOUS at 08:49

## 2025-08-29 RX ADMIN — PROPOFOL 100 MG: 10 INJECTION, EMULSION INTRAVENOUS at 08:55

## 2025-08-29 RX ADMIN — LIDOCAINE HYDROCHLORIDE 40 MG: 20 INJECTION, SOLUTION INTRAVENOUS at 08:55

## 2025-08-29 RX ADMIN — Medication 80 MCG: at 10:37

## 2025-08-29 RX ADMIN — HYDROMORPHONE HYDROCHLORIDE 0.5 MG: 1 INJECTION, SOLUTION INTRAMUSCULAR; INTRAVENOUS; SUBCUTANEOUS at 10:24

## 2025-08-29 SDOH — HEALTH STABILITY: MENTAL HEALTH: CURRENT SMOKER: 1

## 2025-08-29 ASSESSMENT — ENCOUNTER SYMPTOMS
FEVER: 0
VOMITING: 0
SHORTNESS OF BREATH: 0
NAUSEA: 0
CHILLS: 0
COUGH: 0
CHEST TIGHTNESS: 0

## 2025-08-29 ASSESSMENT — PAIN - FUNCTIONAL ASSESSMENT
PAIN_FUNCTIONAL_ASSESSMENT: 0-10

## 2025-08-29 ASSESSMENT — PAIN DESCRIPTION - LOCATION: LOCATION: HAND

## 2025-08-29 ASSESSMENT — PAIN SCALES - GENERAL
PAINLEVEL_OUTOF10: 0 - NO PAIN
PAINLEVEL_OUTOF10: 4
PAINLEVEL_OUTOF10: 0 - NO PAIN

## 2025-08-29 ASSESSMENT — PAIN DESCRIPTION - DESCRIPTORS: DESCRIPTORS: ACHING

## 2025-08-29 ASSESSMENT — PAIN DESCRIPTION - ORIENTATION: ORIENTATION: RIGHT

## 2025-10-10 ENCOUNTER — APPOINTMENT (OUTPATIENT)
Dept: DERMATOLOGY | Facility: CLINIC | Age: 65
End: 2025-10-10
Payer: COMMERCIAL

## 2026-03-10 ENCOUNTER — APPOINTMENT (OUTPATIENT)
Facility: CLINIC | Age: 66
End: 2026-03-10
Payer: COMMERCIAL

## (undated) DEVICE — Device

## (undated) DEVICE — CATHETER TRAY, SURESTEP, 16FR, URINE METER W/STATLOCK

## (undated) DEVICE — SUTURE, SILK, 0, 24 IN, SUTUPAK, BLACK

## (undated) DEVICE — DRESSING, ADHESIVE, ISLAND, TELFA, 4 X 14 IN

## (undated) DEVICE — SUTURE, SURGICAL STEEL, 2, 24 G, 18 IN, MULTIPACK

## (undated) DEVICE — STAPLER, ENDO ECHELON 45MM RELOAD, BLACK, REUSABLE

## (undated) DEVICE — DRESSING, ISLAND, TELFA, 4 X 5 IN

## (undated) DEVICE — SPONGE, LAP, XRAY DECT, 18IN X 18IN, W/LOOP, STERILE

## (undated) DEVICE — BATTERY, VARISPEED

## (undated) DEVICE — GOLF PENCIL, LF, STERILE

## (undated) DEVICE — SUTURE, ETHIBOND EXCEL 1, TAPER POINT CT-1 GREEN 30 INCH

## (undated) DEVICE — REST, HEAD, BAGEL, 9 IN

## (undated) DEVICE — SUTURE, VICRYL, 4-0, 18 IN, UNDYED BR PS-2

## (undated) DEVICE — DRESSING, GAUZE, DRAIN SPONGE, 6 PLY, EXCILON, 4 X 4 IN, STERILE

## (undated) DEVICE — CATHETER, THORACIC, STRAIGHT, ADULT, 28 FR, PVC

## (undated) DEVICE — CLIP, LIGATING, W/ADHESIVE PAD, MEDIUM, TITANIUM

## (undated) DEVICE — PAD, GROUNDING, ELECTROSURGICAL, W/9 FT CABLE, POLYHESIVE II, ADULT, LF

## (undated) DEVICE — DRAPE, INCISE, ANTIMICROBIAL, IOBAN 2, LARGE, 17 X 23 IN, DISPOSABLE, STERILE

## (undated) DEVICE — TUBE, SALEM SUMP, 16 FR X 48IN, ENFIT

## (undated) DEVICE — SUTURE, VICRYL, 2-0, 36 IN, CT-1, UNDYED

## (undated) DEVICE — COVER, CART, 45 X 27 X 48 IN, CLEAR

## (undated) DEVICE — SUTURE, VICRYL, 3-0, 36 IN, CT-1, UNDYED

## (undated) DEVICE — GLOVE, SURGICAL, PROTEXIS PI BLUE W/NEUTHERA, 6.5, PF, LF

## (undated) DEVICE — SUTURE, MONOCRYL, 4-0, 27 IN, PS-2, UNDYED

## (undated) DEVICE — STAPLER, EF POWERED PLUS, CUTTER 340MM, 45MM EFFECTOR, DISP

## (undated) DEVICE — TROCAR, THORAPORT, W/NON-CONDUCTIVE SLEEVE, 11.5 MM, BLACK

## (undated) DEVICE — TOWEL PACK, STERILE, 4/PACK, BLUE

## (undated) DEVICE — SUTURE, PLAIN, 5-0, 18 IN, PC1, YELLOW

## (undated) DEVICE — SCISSORS, WIRE CUTTER, 4 IN, STERILE, DISP

## (undated) DEVICE — SUTURE, VICRYL 0, TAPER POINT, CT-1 VIOLET 27 INCH

## (undated) DEVICE — DRESSING, ADHESIVE, ISLAND, TELFA, 2 X 3.75 IN, LF

## (undated) DEVICE — DRESSING, NON-ADHERENT, TELFA, 3 X 8 IN, NS

## (undated) DEVICE — CARE KIT, LAPAROSCOPIC, ADVANCED

## (undated) DEVICE — CLIP, ENDO APPLIER LIGAMAX 5MM

## (undated) DEVICE — SHEET, SPLIT, CARDIOVASCULAR TIBURON

## (undated) DEVICE — COLLECTION UNIT, DRAINAGE, THORACIC, SINGLE TUBE, DRY SUCTION, ATS COMPATIBLE, OASIS 3600, LF

## (undated) DEVICE — SUTURE, ETHIBOND, XTRA, 30 IN, 0, CT-1, GREEN

## (undated) DEVICE — COVER, LIGHT HANDLE, SURGICAL, FLEXIBLE, DISPOSABLE, STERILE

## (undated) DEVICE — MANIFOLD, 4 PORT NEPTUNE STANDARD

## (undated) DEVICE — SUTURE, MONOCRYL, 3-0, 18 IN, PS2, UNDYED

## (undated) DEVICE — ADHESIVE, SKIN, MASTISOL, 2/3 CC VIAL

## (undated) DEVICE — STRIP, SKIN CLOSURE, STERI STRIP, REINFORCED, 0.5 X 4 IN